# Patient Record
Sex: FEMALE | Race: WHITE | Employment: FULL TIME | URBAN - METROPOLITAN AREA
[De-identification: names, ages, dates, MRNs, and addresses within clinical notes are randomized per-mention and may not be internally consistent; named-entity substitution may affect disease eponyms.]

---

## 2017-01-06 ENCOUNTER — OFFICE VISIT (OUTPATIENT)
Dept: FAMILY MEDICINE CLINIC | Age: 52
End: 2017-01-06

## 2017-01-06 VITALS
HEIGHT: 64 IN | SYSTOLIC BLOOD PRESSURE: 122 MMHG | DIASTOLIC BLOOD PRESSURE: 80 MMHG | HEART RATE: 72 BPM | WEIGHT: 158 LBS | BODY MASS INDEX: 26.98 KG/M2 | OXYGEN SATURATION: 98 %

## 2017-01-06 DIAGNOSIS — E83.52 HYPERCALCEMIA: ICD-10-CM

## 2017-01-06 DIAGNOSIS — F33.1 MODERATE EPISODE OF RECURRENT MAJOR DEPRESSIVE DISORDER (HCC): Primary | ICD-10-CM

## 2017-01-06 DIAGNOSIS — R12 HEARTBURN: ICD-10-CM

## 2017-01-06 DIAGNOSIS — K42.9 UMBILICAL HERNIA WITHOUT OBSTRUCTION AND WITHOUT GANGRENE: ICD-10-CM

## 2017-01-06 PROCEDURE — 99213 OFFICE O/P EST LOW 20 MIN: CPT | Performed by: FAMILY MEDICINE

## 2017-01-06 RX ORDER — ESCITALOPRAM OXALATE 20 MG/1
20 TABLET ORAL DAILY
Qty: 90 TABLET | Refills: 3 | Status: SHIPPED | OUTPATIENT
Start: 2017-01-06 | End: 2017-12-17 | Stop reason: SDUPTHER

## 2017-01-06 RX ORDER — LANSOPRAZOLE 30 MG/1
30 CAPSULE, DELAYED RELEASE ORAL DAILY
Qty: 30 CAPSULE | Refills: 3 | Status: SHIPPED | OUTPATIENT
Start: 2017-01-06 | End: 2017-05-24 | Stop reason: SDUPTHER

## 2017-01-06 RX ORDER — ESCITALOPRAM OXALATE 20 MG/1
20 TABLET ORAL DAILY
Qty: 30 TABLET | Refills: 3 | Status: SHIPPED | OUTPATIENT
Start: 2017-01-06 | End: 2017-01-06 | Stop reason: SDUPTHER

## 2017-01-11 LAB
ANION GAP SERPL CALCULATED.3IONS-SCNC: 12 MMOL/L (ref 3–16)
BUN BLDV-MCNC: 14 MG/DL (ref 7–20)
CALCIUM IONIZED: 1.54 MMOL/L (ref 1.12–1.32)
CALCIUM SERPL-MCNC: 11.8 MG/DL (ref 8.3–10.6)
CHLORIDE BLD-SCNC: 102 MMOL/L (ref 99–110)
CO2: 27 MMOL/L (ref 21–32)
CREAT SERPL-MCNC: 0.9 MG/DL (ref 0.6–1.1)
GFR AFRICAN AMERICAN: >60
GFR NON-AFRICAN AMERICAN: >60
GLUCOSE BLD-MCNC: 91 MG/DL (ref 70–99)
PH VENOUS: 7.39 (ref 7.35–7.45)
POTASSIUM SERPL-SCNC: 5 MMOL/L (ref 3.5–5.1)
SODIUM BLD-SCNC: 141 MMOL/L (ref 136–145)

## 2017-01-13 DIAGNOSIS — E83.52 HYPERCALCEMIA: Primary | ICD-10-CM

## 2017-01-13 DIAGNOSIS — E83.52 HYPERCALCEMIA: ICD-10-CM

## 2017-01-14 LAB — PARATHYROID HORMONE INTACT: 216.7 PG/ML (ref 14–72)

## 2017-02-03 ENCOUNTER — OFFICE VISIT (OUTPATIENT)
Dept: FAMILY MEDICINE CLINIC | Age: 52
End: 2017-02-03

## 2017-02-03 VITALS
SYSTOLIC BLOOD PRESSURE: 138 MMHG | WEIGHT: 158 LBS | DIASTOLIC BLOOD PRESSURE: 88 MMHG | OXYGEN SATURATION: 98 % | HEIGHT: 64 IN | TEMPERATURE: 97.8 F | BODY MASS INDEX: 26.98 KG/M2 | HEART RATE: 67 BPM

## 2017-02-03 DIAGNOSIS — E21.3 HYPERPARATHYROIDISM (HCC): ICD-10-CM

## 2017-02-03 DIAGNOSIS — E83.52 HYPERCALCEMIA: ICD-10-CM

## 2017-02-03 DIAGNOSIS — Z01.818 PREOP EXAMINATION: Primary | ICD-10-CM

## 2017-02-03 PROBLEM — R12 HEARTBURN: Status: ACTIVE | Noted: 2017-02-03

## 2017-02-03 PROCEDURE — 99241 PR OFFICE CONSULTATION NEW/ESTAB PATIENT 15 MIN: CPT | Performed by: FAMILY MEDICINE

## 2017-02-06 LAB
CALCIUM IONIZED: 1.41 MMOL/L (ref 1.12–1.32)
CALCIUM SERPL-MCNC: 11.3 MG/DL (ref 8.3–10.6)
PARATHYROID HORMONE INTACT: 237.1 PG/ML (ref 14–72)
PH VENOUS: 7.36 (ref 7.35–7.45)
VITAMIN D 25-HYDROXY: 27.2 NG/ML

## 2017-04-05 ENCOUNTER — OFFICE VISIT (OUTPATIENT)
Dept: ENDOCRINOLOGY | Age: 52
End: 2017-04-05

## 2017-04-05 VITALS
HEIGHT: 64 IN | BODY MASS INDEX: 27.39 KG/M2 | OXYGEN SATURATION: 97 % | WEIGHT: 160.4 LBS | DIASTOLIC BLOOD PRESSURE: 82 MMHG | HEART RATE: 79 BPM | SYSTOLIC BLOOD PRESSURE: 120 MMHG

## 2017-04-05 DIAGNOSIS — Z98.890 HISTORY OF PARATHYROID SURGERY: ICD-10-CM

## 2017-04-05 DIAGNOSIS — N95.1 MENOPAUSAL STATE: ICD-10-CM

## 2017-04-05 DIAGNOSIS — E21.3 HYPERPARATHYROIDISM (HCC): Primary | ICD-10-CM

## 2017-04-05 DIAGNOSIS — E55.9 VITAMIN D DEFICIENCY: ICD-10-CM

## 2017-04-05 DIAGNOSIS — N20.0 NEPHROLITHIASIS: ICD-10-CM

## 2017-04-05 PROCEDURE — 99204 OFFICE O/P NEW MOD 45 MIN: CPT | Performed by: INTERNAL MEDICINE

## 2017-04-05 ASSESSMENT — PATIENT HEALTH QUESTIONNAIRE - PHQ9
2. FEELING DOWN, DEPRESSED OR HOPELESS: 0
SUM OF ALL RESPONSES TO PHQ9 QUESTIONS 1 & 2: 0
SUM OF ALL RESPONSES TO PHQ QUESTIONS 1-9: 0
1. LITTLE INTEREST OR PLEASURE IN DOING THINGS: 0

## 2017-04-10 LAB
A/G RATIO: 1.9 (ref 1.1–2.2)
ALBUMIN SERPL-MCNC: 4.6 G/DL (ref 3.4–5)
ALP BLD-CCNC: 90 U/L (ref 40–129)
ALT SERPL-CCNC: 15 U/L (ref 10–40)
ANION GAP SERPL CALCULATED.3IONS-SCNC: 16 MMOL/L (ref 3–16)
AST SERPL-CCNC: 19 U/L (ref 15–37)
BILIRUB SERPL-MCNC: 0.3 MG/DL (ref 0–1)
BUN BLDV-MCNC: 12 MG/DL (ref 7–20)
CALCIUM IONIZED: 1.2 MMOL/L (ref 1.12–1.32)
CALCIUM SERPL-MCNC: 9.5 MG/DL (ref 8.3–10.6)
CHLORIDE BLD-SCNC: 98 MMOL/L (ref 99–110)
CO2: 26 MMOL/L (ref 21–32)
CREAT SERPL-MCNC: 0.6 MG/DL (ref 0.6–1.1)
FOLATE: 9.96 NG/ML (ref 4.78–24.2)
GFR AFRICAN AMERICAN: >60
GFR NON-AFRICAN AMERICAN: >60
GLOBULIN: 2.4 G/DL
GLUCOSE BLD-MCNC: 90 MG/DL (ref 70–99)
HOMOCYSTEINE: 15 UMOL/L (ref 0–10)
MAGNESIUM: 2.3 MG/DL (ref 1.8–2.4)
PARATHYROID HORMONE INTACT: 68 PG/ML (ref 14–72)
PH VENOUS: 7.24 (ref 7.35–7.45)
PHOSPHORUS: 3.6 MG/DL (ref 2.5–4.9)
POTASSIUM SERPL-SCNC: 4.6 MMOL/L (ref 3.5–5.1)
SODIUM BLD-SCNC: 140 MMOL/L (ref 136–145)
TOTAL PROTEIN: 7 G/DL (ref 6.4–8.2)
VITAMIN B-12: 344 PG/ML (ref 211–911)
VITAMIN D 25-HYDROXY: 30.7 NG/ML

## 2017-05-17 ENCOUNTER — TELEPHONE (OUTPATIENT)
Dept: ENDOCRINOLOGY | Age: 52
End: 2017-05-17

## 2017-05-25 RX ORDER — LANSOPRAZOLE 30 MG/1
30 CAPSULE, DELAYED RELEASE ORAL DAILY
Qty: 90 CAPSULE | Refills: 3 | Status: SHIPPED | OUTPATIENT
Start: 2017-05-25 | End: 2018-01-29 | Stop reason: SDUPTHER

## 2017-10-12 ENCOUNTER — OFFICE VISIT (OUTPATIENT)
Dept: FAMILY MEDICINE CLINIC | Age: 52
End: 2017-10-12

## 2017-10-12 VITALS
SYSTOLIC BLOOD PRESSURE: 134 MMHG | WEIGHT: 164 LBS | DIASTOLIC BLOOD PRESSURE: 98 MMHG | OXYGEN SATURATION: 98 % | BODY MASS INDEX: 28.15 KG/M2 | HEART RATE: 75 BPM

## 2017-10-12 DIAGNOSIS — M65.331 TRIGGER MIDDLE FINGER OF RIGHT HAND: ICD-10-CM

## 2017-10-12 DIAGNOSIS — I10 ESSENTIAL HYPERTENSION: Primary | ICD-10-CM

## 2017-10-12 DIAGNOSIS — R07.9 CHEST PAIN, UNSPECIFIED TYPE: ICD-10-CM

## 2017-10-12 PROCEDURE — 99214 OFFICE O/P EST MOD 30 MIN: CPT | Performed by: FAMILY MEDICINE

## 2017-10-12 RX ORDER — AMLODIPINE BESYLATE 5 MG/1
5 TABLET ORAL DAILY
Qty: 90 TABLET | Refills: 3 | Status: SHIPPED | OUTPATIENT
Start: 2017-10-12 | End: 2018-01-29 | Stop reason: SDUPTHER

## 2017-10-12 NOTE — PATIENT INSTRUCTIONS

## 2017-10-12 NOTE — PROGRESS NOTES
Subjective:      Patient ID: Claribel Palacios is a 47 y.o. female. HPI patient presents today for her ED follow up from [address] [place] [address].  Patient states that she woke up in the night with chest pain and tightness in the chest.  Her BP was 150/100, 140/90 at home. Here for ED f/u. Went to ED at [facility] with chest pain. Gets \"pinches\" in chest off and on but this lasted longer and woke up in middle of night feeling sick, pain, nausea and vomiting. Does take a heartburn pill before bed normally. Noted bp high during this and the next day better but still little high. Went to work and got BP checked and still high 170/100's so went to ED. Neg EKG and labs in ED, bp 140/70;s.  Sent home and felt ok since. Reveiwed records in care everywhere. Has checked bp few times and top number ok but DBP in 90's. Has noted it creeping up. Has also gained 20 lbs over past few years and thinks that is not helping. Locking of right middle finger x 6 months, swollen, painful. Review of Systems    Objective:   Physical Exam    Body mass index is 28.15 kg/m². Vitals:    10/12/17 1540   BP: (!) 134/98   Site: Left Arm   Position: Sitting   Cuff Size: Large Adult   Pulse: 75   SpO2: 98%   Weight: 164 lb (74.4 kg)     Wt Readings from Last 3 Encounters:   10/12/17 164 lb (74.4 kg)   04/05/17 160 lb 6.4 oz (72.8 kg)   02/03/17 158 lb (71.7 kg)       GENERAL:Alert and oriented x 4 NAD, overweight, well hydrated, well developed.   NECK:supple and non tender without mass, no thyromegaly or thyroid nodules, no cervical lymphadenopathy  LUNG:clear to auscultation bilaterally with normal respiratory effort  CV: Normal heart sounds, regular rate and rhythm without murmurs  EXTREMETY: no loss of hair, no edema, normal pedal pulses bilaterally  rigth hand with triggering middle finger    PHQ-9 Total Score: 0 (4/5/2017  6:14 PM)        Assessment:      Diagnoses and all orders for this visit:    Essential hypertension  Start amlodipine  Monitor at home  RTO 1 month  Work on weight loss    Chest pain, unspecified type  Monitor, ? From GERD    Trigger middle finger of right hand  -     Yenny Sifuentes MD (Hand, Wrist, Upper Extremity)  See hand specialist    Other orders  -     amLODIPine (NORVASC) 5 MG tablet;  Take 1 tablet by mouth daily

## 2017-10-17 ENCOUNTER — OFFICE VISIT (OUTPATIENT)
Dept: ORTHOPEDIC SURGERY | Age: 52
End: 2017-10-17

## 2017-10-17 VITALS
DIASTOLIC BLOOD PRESSURE: 99 MMHG | RESPIRATION RATE: 16 BRPM | BODY MASS INDEX: 26.66 KG/M2 | WEIGHT: 160 LBS | HEIGHT: 65 IN | SYSTOLIC BLOOD PRESSURE: 154 MMHG

## 2017-10-17 DIAGNOSIS — M65.331 TRIGGER MIDDLE FINGER OF RIGHT HAND: ICD-10-CM

## 2017-10-17 PROCEDURE — 99243 OFF/OP CNSLTJ NEW/EST LOW 30: CPT | Performed by: ORTHOPAEDIC SURGERY

## 2017-10-17 PROCEDURE — 20550 NJX 1 TENDON SHEATH/LIGAMENT: CPT | Performed by: ORTHOPAEDIC SURGERY

## 2017-10-17 NOTE — PROGRESS NOTES
This 47 y.o., right hand dominant  is seen in consultation for Lesly Sim MD with a chief complaint of pain, swelling, stiffness and intermittant snapping of the right middle finger. Symptoms have been present for 7 months. The digit is stiff, especially in the morning and will frequently stick in the palm when flexed and pop when extended. This is frequently associated with pain. Mild swelling has been noticed. Treatment has not been prescribed. The problem has worsened slightly recently. There is no history of injury or significant overuse. The pain assessment has been reviewed and is correct as stated. .    The patient's social history, past medical history, family history, medications, allergies and review of systems, entered 10/17/17, have been reviewed, and dated and are recorded in the chart. On examination the patient is Height: 5' 5\" (165.1 cm) tall and weighs Weight: 160 lb (72.6 kg). Respirations are 18 per minute. The patient is well nourished, is oriented to time and place, demonstrates appropriate mood and affect as well as normal gait and station. There is mild soft tissue swelling of the digit. There is no deformity. There is tenderness, thickening and nodularity at the base of the flexor tendon sheath. Range of motion is slightly limited in flexion and extension. The digit sticks in flexion and pops into extension, accompanied by some pain. Skin is intact without lesions. Distal circulation and sensation are intact. Muscle strength and coordination are normal.  Reflexes are present bilaterally. Joints are stable. There are no subcutaneous nodules or enlarged epitrochlear lymph nodes. Impression: Right middle finger trigger digit. The nature of this medical problem is fully discussed with the patient, including all treatment options. All questions are answered.     The right  hand is prepared with Betadine and alcohol and the flexor tendon sheath of the right middle finger is injected with 1/2 milliliter of 1% lidocaine and 20 mg.of triamcinalone, with good filling. The patient is advised regarding the expected response and possible reactions from the injection. The patient is asked to call me if full, painless function has not returned within 4 weeks. The possibility of recurrence of the problem is discussed.

## 2017-10-18 ENCOUNTER — OFFICE VISIT (OUTPATIENT)
Dept: FAMILY MEDICINE CLINIC | Age: 52
End: 2017-10-18

## 2017-10-18 VITALS
DIASTOLIC BLOOD PRESSURE: 98 MMHG | SYSTOLIC BLOOD PRESSURE: 146 MMHG | HEART RATE: 90 BPM | TEMPERATURE: 98.4 F | WEIGHT: 163 LBS | BODY MASS INDEX: 27.12 KG/M2 | OXYGEN SATURATION: 97 %

## 2017-10-18 DIAGNOSIS — R22.1 LOCALIZED SWELLING, MASS AND LUMP, NECK: Primary | ICD-10-CM

## 2017-10-18 PROCEDURE — 99213 OFFICE O/P EST LOW 20 MIN: CPT | Performed by: FAMILY MEDICINE

## 2017-10-18 NOTE — PROGRESS NOTES
Subjective:      Patient ID: Davon Alberto is a 47 y.o. female. HPI patient presents today for swelling around her neck for 1 day and c/o dizziness, drainage, congestion, cough. Her with neck swelling x 1 day, noted last night. Has sore throat. No dif swallowing or breathing. Some sinus drainage x 2 days. Sneezing. No fever. Review of Systems    Objective:   Physical Exam   Neck:         Allergies   Allergen Reactions    Sulfa Antibiotics Nausea And Vomiting       Vitals:    10/18/17 1508   BP: (!) 146/98   Site: Left Arm   Position: Sitting   Cuff Size: Large Adult   Pulse: 90   Temp: 98.4 °F (36.9 °C)   TempSrc: Tympanic   SpO2: 97%   Weight: 163 lb (73.9 kg)     Wt Readings from Last 3 Encounters:   10/18/17 163 lb (73.9 kg)   10/17/17 160 lb (72.6 kg)   10/12/17 164 lb (74.4 kg)     Body mass index is 27.12 kg/m². Alert and oriented x 4 NAD, overweight, well hydrated, well developed. No nodes neck  No nodes axilla bilaterally  Soft swelling bilateral neck and upper chest area above clavicles L>R  Lung clear with good air movement and effort  CV rrr      Assessment:      Satnam Underwood was seen today for neck swelling. Diagnoses and all orders for this visit:    Localized swelling, mass and lump, neck  -     US Head Neck Soft Tissue Thyroid;  Future    get u/s neck  If normal see surgeon

## 2017-10-19 ENCOUNTER — HOSPITAL ENCOUNTER (OUTPATIENT)
Dept: ULTRASOUND IMAGING | Age: 52
Discharge: HOME | End: 2017-10-19
Attending: FAMILY MEDICINE | Admitting: FAMILY MEDICINE

## 2017-10-19 DIAGNOSIS — R22.1 LOCALIZED SWELLING, MASS OR LUMP OF NECK: ICD-10-CM

## 2017-10-19 DIAGNOSIS — R22.1 LOCALIZED SWELLING, MASS AND LUMP, NECK: ICD-10-CM

## 2017-11-10 ENCOUNTER — OFFICE VISIT (OUTPATIENT)
Dept: FAMILY MEDICINE CLINIC | Age: 52
End: 2017-11-10

## 2017-11-10 VITALS
OXYGEN SATURATION: 98 % | SYSTOLIC BLOOD PRESSURE: 136 MMHG | WEIGHT: 160 LBS | HEIGHT: 65 IN | DIASTOLIC BLOOD PRESSURE: 80 MMHG | HEART RATE: 82 BPM | BODY MASS INDEX: 26.66 KG/M2

## 2017-11-10 DIAGNOSIS — Z01.818 PREOP EXAMINATION: ICD-10-CM

## 2017-11-10 DIAGNOSIS — K42.9 UMBILICAL HERNIA WITHOUT OBSTRUCTION AND WITHOUT GANGRENE: Primary | ICD-10-CM

## 2017-11-10 DIAGNOSIS — I10 ESSENTIAL HYPERTENSION: ICD-10-CM

## 2017-11-10 PROCEDURE — 99241 PR OFFICE CONSULTATION NEW/ESTAB PATIENT 15 MIN: CPT | Performed by: FAMILY MEDICINE

## 2017-11-10 PROCEDURE — 93000 ELECTROCARDIOGRAM COMPLETE: CPT | Performed by: FAMILY MEDICINE

## 2017-12-19 RX ORDER — ESCITALOPRAM OXALATE 20 MG/1
TABLET ORAL
Qty: 90 TABLET | Refills: 1 | Status: SHIPPED | OUTPATIENT
Start: 2017-12-19 | End: 2018-01-29

## 2018-01-29 ENCOUNTER — HOSPITAL ENCOUNTER (OUTPATIENT)
Dept: OTHER | Age: 53
Discharge: HOME | End: 2018-01-29
Attending: FAMILY MEDICINE | Admitting: FAMILY MEDICINE

## 2018-01-29 ENCOUNTER — OFFICE VISIT (OUTPATIENT)
Dept: FAMILY MEDICINE CLINIC | Age: 53
End: 2018-01-29

## 2018-01-29 VITALS
OXYGEN SATURATION: 98 % | DIASTOLIC BLOOD PRESSURE: 94 MMHG | HEART RATE: 95 BPM | WEIGHT: 158 LBS | BODY MASS INDEX: 26.29 KG/M2 | SYSTOLIC BLOOD PRESSURE: 140 MMHG

## 2018-01-29 DIAGNOSIS — H53.9 VISION DISTURBANCE: Primary | ICD-10-CM

## 2018-01-29 DIAGNOSIS — E21.3 HYPERPARATHYROIDISM (HCC): ICD-10-CM

## 2018-01-29 DIAGNOSIS — T16.9XXA: ICD-10-CM

## 2018-01-29 DIAGNOSIS — R41.0 DISORIENTATION: ICD-10-CM

## 2018-01-29 LAB
A/G RATIO: 1.5 (ref 1.1–2.2)
ALBUMIN SERPL-MCNC: 4.7 G/DL (ref 3.4–5)
ALP BLD-CCNC: 72 U/L (ref 40–129)
ALT SERPL-CCNC: 18 U/L (ref 10–40)
ANION GAP SERPL CALCULATED.3IONS-SCNC: 19 MMOL/L (ref 3–16)
AST SERPL-CCNC: 23 U/L (ref 15–37)
BILIRUB SERPL-MCNC: 0.3 MG/DL (ref 0–1)
BUN BLDV-MCNC: 20 MG/DL (ref 7–20)
CALCIUM SERPL-MCNC: 9.3 MG/DL (ref 8.3–10.6)
CHLORIDE BLD-SCNC: 98 MMOL/L (ref 99–110)
CO2: 21 MMOL/L (ref 21–32)
CREAT SERPL-MCNC: 0.6 MG/DL (ref 0.6–1.1)
GFR AFRICAN AMERICAN: >60
GFR NON-AFRICAN AMERICAN: >60
GLOBULIN: 3.2 G/DL
GLUCOSE BLD-MCNC: 80 MG/DL (ref 70–99)
PARATHYROID HORMONE INTACT: 45.2 PG/ML (ref 14–72)
POTASSIUM SERPL-SCNC: 4.2 MMOL/L (ref 3.5–5.1)
SODIUM BLD-SCNC: 138 MMOL/L (ref 136–145)
TOTAL PROTEIN: 7.9 G/DL (ref 6.4–8.2)
VITAMIN D 25-HYDROXY: 40.7 NG/ML

## 2018-01-29 PROCEDURE — 99214 OFFICE O/P EST MOD 30 MIN: CPT | Performed by: FAMILY MEDICINE

## 2018-01-29 RX ORDER — LANSOPRAZOLE 30 MG/1
30 CAPSULE, DELAYED RELEASE ORAL DAILY
Qty: 90 CAPSULE | Refills: 3 | Status: SHIPPED | OUTPATIENT
Start: 2018-01-29 | End: 2021-05-25 | Stop reason: SDUPTHER

## 2018-01-29 RX ORDER — AMLODIPINE BESYLATE 5 MG/1
5 TABLET ORAL DAILY
Qty: 90 TABLET | Refills: 3 | Status: SHIPPED | OUTPATIENT
Start: 2018-01-29 | End: 2019-01-09 | Stop reason: SDUPTHER

## 2018-01-29 RX ORDER — LORAZEPAM 1 MG/1
TABLET ORAL
Qty: 2 TABLET | Refills: 0 | Status: SHIPPED | OUTPATIENT
Start: 2018-01-29 | End: 2018-03-28

## 2018-01-29 NOTE — PROGRESS NOTES
bilaterally  Lungs CTA B with good air movement  CV RRR no M appreciated  CN grossly intact  PERRL, EOMI, no nystagmus  Normal UE and LE strength bilaterally  Normal reflexes bilaterally knee and elbow  Normal sensation to light touch  Normal FTN, ELLA normal, neg rhomberg, neg pronator drift  Normal gait  Tandem gait normal, heel walk normal, toe walk normal    Assessment:      Deepa Pineda was seen today for memory loss. Diagnoses and all orders for this visit:    Vision disturbance  Disorientation  ? Migraine HA  Check labs and MRI brain    Non-penetrating foreign body in ear canal, initial encounter  Dog hair in ears, if bothers see ENT    Hyperparathyroidism ([facility] [address]  -     Comprehensive Metabolic Panel; Future  -     PTH, INTACT; Future  -     VITAMIN D 25 HYDROXY; Future    Other orders  -     amLODIPine (NORVASC) 5 MG tablet; Take 1 tablet by mouth daily  -     lansoprazole (PREVACID) 30 MG delayed release capsule; Take 1 capsule by mouth daily  -     LORazepam (ATIVAN) 1 MG tablet; Take 1 tab 30 min prior to procedure if needed for anxiety, may repeat x 1. Must have . [place]

## 2018-01-30 ENCOUNTER — TELEPHONE (OUTPATIENT)
Dept: FAMILY MEDICINE CLINIC | Age: 53
End: 2018-01-30

## 2018-01-30 DIAGNOSIS — H53.9 VISUAL DISTURBANCE: Primary | ICD-10-CM

## 2018-01-30 DIAGNOSIS — R41.0 DISORIENTATION: ICD-10-CM

## 2018-02-01 ENCOUNTER — TELEPHONE (OUTPATIENT)
Dept: FAMILY MEDICINE CLINIC | Age: 53
End: 2018-02-01

## 2018-02-01 DIAGNOSIS — E23.7 PITUITARY LESION (HCC): Primary | ICD-10-CM

## 2018-02-01 NOTE — TELEPHONE ENCOUNTER
Fax received from [email or web address] requesting alternative rx for lansoprazole. Approved alternatives are omeprazole and pantoprazole. Please advise.

## 2018-02-02 DIAGNOSIS — E23.7 PITUITARY LESION (HCC): ICD-10-CM

## 2018-02-02 LAB — PROLACTIN: 6.1 NG/ML

## 2018-02-02 NOTE — TELEPHONE ENCOUNTER
Please ignore previous note entered by error. Needing to know which preferred alternative is ok. Please advise.

## 2018-06-07 ENCOUNTER — TELEPHONE (OUTPATIENT)
Dept: FAMILY MEDICINE CLINIC | Age: 53
End: 2018-06-07

## 2018-06-07 ENCOUNTER — OFFICE VISIT (OUTPATIENT)
Dept: FAMILY MEDICINE CLINIC | Age: 53
End: 2018-06-07

## 2018-06-07 VITALS
OXYGEN SATURATION: 98 % | WEIGHT: 156 LBS | HEART RATE: 93 BPM | SYSTOLIC BLOOD PRESSURE: 132 MMHG | DIASTOLIC BLOOD PRESSURE: 82 MMHG | BODY MASS INDEX: 25.96 KG/M2

## 2018-06-07 DIAGNOSIS — Z12.31 ENCOUNTER FOR SCREENING MAMMOGRAM FOR BREAST CANCER: ICD-10-CM

## 2018-06-07 DIAGNOSIS — E78.00 PURE HYPERCHOLESTEROLEMIA: ICD-10-CM

## 2018-06-07 DIAGNOSIS — Z12.11 COLON CANCER SCREENING: ICD-10-CM

## 2018-06-07 DIAGNOSIS — Z00.00 WELL ADULT EXAM: Primary | ICD-10-CM

## 2018-06-07 DIAGNOSIS — I10 ESSENTIAL HYPERTENSION: ICD-10-CM

## 2018-06-07 PROCEDURE — 99396 PREV VISIT EST AGE 40-64: CPT | Performed by: FAMILY MEDICINE

## 2018-06-07 RX ORDER — ATORVASTATIN CALCIUM 40 MG/1
40 TABLET, FILM COATED ORAL DAILY
Qty: 30 TABLET | Refills: 3 | Status: SHIPPED | OUTPATIENT
Start: 2018-06-07 | End: 2018-06-07 | Stop reason: SDUPTHER

## 2018-06-07 RX ORDER — ATORVASTATIN CALCIUM 40 MG/1
40 TABLET, FILM COATED ORAL DAILY
Qty: 90 TABLET | Refills: 3 | Status: SHIPPED | OUTPATIENT
Start: 2018-06-07 | End: 2019-01-09 | Stop reason: SDUPTHER

## 2018-06-07 ASSESSMENT — PATIENT HEALTH QUESTIONNAIRE - PHQ9
SUM OF ALL RESPONSES TO PHQ9 QUESTIONS 1 & 2: 0
2. FEELING DOWN, DEPRESSED OR HOPELESS: 0
1. LITTLE INTEREST OR PLEASURE IN DOING THINGS: 0
SUM OF ALL RESPONSES TO PHQ QUESTIONS 1-9: 0

## 2018-06-15 RX ORDER — ESCITALOPRAM OXALATE 20 MG/1
TABLET ORAL
Qty: 90 TABLET | Refills: 1 | OUTPATIENT
Start: 2018-06-15

## 2018-09-04 ENCOUNTER — OFFICE VISIT (OUTPATIENT)
Dept: ORTHOPEDIC SURGERY | Age: 53
End: 2018-09-04

## 2018-09-04 VITALS
BODY MASS INDEX: 25.99 KG/M2 | WEIGHT: 156 LBS | RESPIRATION RATE: 16 BRPM | DIASTOLIC BLOOD PRESSURE: 86 MMHG | HEIGHT: 65 IN | SYSTOLIC BLOOD PRESSURE: 130 MMHG

## 2018-09-04 DIAGNOSIS — M65.331 TRIGGER MIDDLE FINGER OF RIGHT HAND: Primary | ICD-10-CM

## 2018-09-04 PROCEDURE — 99213 OFFICE O/P EST LOW 20 MIN: CPT | Performed by: ORTHOPAEDIC SURGERY

## 2018-09-04 PROCEDURE — 20550 NJX 1 TENDON SHEATH/LIGAMENT: CPT | Performed by: ORTHOPAEDIC SURGERY

## 2019-01-09 RX ORDER — AMLODIPINE BESYLATE 5 MG/1
5 TABLET ORAL DAILY
Qty: 90 TABLET | Refills: 3 | Status: SHIPPED | OUTPATIENT
Start: 2019-01-09 | End: 2020-02-17

## 2019-01-09 RX ORDER — ATORVASTATIN CALCIUM 40 MG/1
40 TABLET, FILM COATED ORAL DAILY
Qty: 90 TABLET | Refills: 3 | Status: SHIPPED | OUTPATIENT
Start: 2019-01-09 | End: 2020-03-02

## 2019-01-14 RX ORDER — PANTOPRAZOLE SODIUM 40 MG/1
TABLET, DELAYED RELEASE ORAL
Qty: 90 TABLET | Refills: 3 | OUTPATIENT
Start: 2019-01-14

## 2019-04-01 ENCOUNTER — OFFICE VISIT (OUTPATIENT)
Dept: ORTHOPEDIC SURGERY | Age: 54
End: 2019-04-01
Payer: COMMERCIAL

## 2019-04-01 VITALS
SYSTOLIC BLOOD PRESSURE: 139 MMHG | HEIGHT: 66 IN | BODY MASS INDEX: 25.71 KG/M2 | DIASTOLIC BLOOD PRESSURE: 86 MMHG | HEART RATE: 72 BPM | WEIGHT: 160 LBS

## 2019-04-01 DIAGNOSIS — M65.331 TRIGGER MIDDLE FINGER OF RIGHT HAND: Primary | ICD-10-CM

## 2019-04-01 PROCEDURE — 20550 NJX 1 TENDON SHEATH/LIGAMENT: CPT | Performed by: ORTHOPAEDIC SURGERY

## 2019-04-01 PROCEDURE — 99213 OFFICE O/P EST LOW 20 MIN: CPT | Performed by: ORTHOPAEDIC SURGERY

## 2019-04-01 NOTE — PROGRESS NOTES
I last examined this patient 6 months ago at which time I injected the right middle finger for treatment of trigger finger. She obtained prompt and complete relief of all symptoms. Unfortunately, the condition has recurred and the patient returns to the office requesting additional treatment. She complains of pain, swelling, catching and stiffness of the digit for the past 3 months. Symptoms have become worse recently. The patient's social history, past medical history, family history, medications, allergies and review of systems have been reviewed, and dated 4/1/19 and are recorded in the chart. On examination there is mild soft tissue swelling of the digit. There is no deformity. There is tenderness, thickening and nodularity at the base of the flexor tendon sheath. Range of motion is slightly limited in flexion and extension. The digit sticks in flexion and pops into extension, accompanied by some pain. Skin is intact without lesions. Distal circulation and sensation are intact. Muscle strength and coordination are normal.  Reflexes and present bilaterally. Joints are stable. There are no subcutaneous nodules or enlarged epitrochlear lymph nodes. Impression: Recurrent right middle finger trigger digit. The nature of this medical problem is fully discussed with the patient, including all treatment options. All questions are answered. The right  hand is prepared with Betadine and alcohol and the flexor tendon sheath of the right middle finger is injected with 1/2 milliliter of 1% lidocaine and 20 mg.of triamcinalone, with good filling. The patient is advised regarding the expected response and possible reactions from the injection. The patient is asked to call me if full, painless function has not returned within 4 weeks. The patient understands that this is the 3rd and last steroid injection for this digit.   If the problem recurs in this digit, they are asked to call me to discus surgical correction.

## 2019-05-10 NOTE — PRE-PROCEDURE INSTRUCTIONS
Pre-procedure phone call was made to patient and there was no answer. Detailed message was left for patient.

## 2019-05-13 ENCOUNTER — HOSPITAL ENCOUNTER (OUTPATIENT)
Age: 54
Setting detail: OUTPATIENT SURGERY
Discharge: HOME | End: 2019-05-13
Attending: INTERNAL MEDICINE | Admitting: INTERNAL MEDICINE
Payer: COMMERCIAL

## 2019-05-13 VITALS
DIASTOLIC BLOOD PRESSURE: 89 MMHG | BODY MASS INDEX: 24.91 KG/M2 | SYSTOLIC BLOOD PRESSURE: 122 MMHG | HEIGHT: 66 IN | HEART RATE: 87 BPM | TEMPERATURE: 97.1 F | OXYGEN SATURATION: 96 % | WEIGHT: 155 LBS | RESPIRATION RATE: 16 BRPM

## 2019-05-13 PROCEDURE — 2709999900 HC NON-CHARGEABLE SUPPLY: Performed by: INTERNAL MEDICINE

## 2019-05-13 PROCEDURE — 7100000011 HC PHASE II RECOVERY - ADDTL 15 MIN: Performed by: INTERNAL MEDICINE

## 2019-05-13 PROCEDURE — 99152 MOD SED SAME PHYS/QHP 5/>YRS: CPT | Performed by: INTERNAL MEDICINE

## 2019-05-13 PROCEDURE — 3609010600 HC COLONOSCOPY POLYPECTOMY SNARE/COLD BIOPSY: Performed by: INTERNAL MEDICINE

## 2019-05-13 PROCEDURE — 7100000010 HC PHASE II RECOVERY - FIRST 15 MIN: Performed by: INTERNAL MEDICINE

## 2019-05-13 PROCEDURE — 6360000002 HC RX W HCPCS: Performed by: INTERNAL MEDICINE

## 2019-05-13 PROCEDURE — 99153 MOD SED SAME PHYS/QHP EA: CPT | Performed by: INTERNAL MEDICINE

## 2019-05-13 PROCEDURE — 2580000003 HC RX 258: Performed by: INTERNAL MEDICINE

## 2019-05-13 RX ORDER — MIDAZOLAM HYDROCHLORIDE 1 MG/ML
INJECTION INTRAMUSCULAR; INTRAVENOUS PRN
Status: DISCONTINUED | OUTPATIENT
Start: 2019-05-13 | End: 2019-05-13 | Stop reason: ALTCHOICE

## 2019-05-13 RX ORDER — SODIUM CHLORIDE 9 MG/ML
INJECTION, SOLUTION INTRAVENOUS CONTINUOUS
Status: DISCONTINUED | OUTPATIENT
Start: 2019-05-13 | End: 2019-05-13 | Stop reason: HOSPADM

## 2019-05-13 RX ORDER — FENTANYL CITRATE 50 UG/ML
INJECTION, SOLUTION INTRAMUSCULAR; INTRAVENOUS PRN
Status: DISCONTINUED | OUTPATIENT
Start: 2019-05-13 | End: 2019-05-13 | Stop reason: ALTCHOICE

## 2019-05-13 RX ADMIN — SODIUM CHLORIDE: 9 INJECTION, SOLUTION INTRAVENOUS at 06:31

## 2019-05-13 ASSESSMENT — PAIN SCALES - GENERAL
PAINLEVEL_OUTOF10: 0

## 2019-05-13 ASSESSMENT — PAIN - FUNCTIONAL ASSESSMENT: PAIN_FUNCTIONAL_ASSESSMENT: 0-10

## 2020-02-17 RX ORDER — AMLODIPINE BESYLATE 5 MG/1
TABLET ORAL
Qty: 90 TABLET | Refills: 4 | Status: SHIPPED | OUTPATIENT
Start: 2020-02-17 | End: 2021-05-13

## 2020-03-02 RX ORDER — ATORVASTATIN CALCIUM 40 MG/1
TABLET, FILM COATED ORAL
Qty: 90 TABLET | Refills: 4 | Status: SHIPPED | OUTPATIENT
Start: 2020-03-02 | End: 2021-05-20

## 2021-05-12 DIAGNOSIS — I10 ESSENTIAL HYPERTENSION: ICD-10-CM

## 2021-05-12 DIAGNOSIS — E78.00 PURE HYPERCHOLESTEROLEMIA: Primary | ICD-10-CM

## 2021-05-13 RX ORDER — AMLODIPINE BESYLATE 5 MG/1
TABLET ORAL
Qty: 30 TABLET | Refills: 0 | Status: SHIPPED | OUTPATIENT
Start: 2021-05-13 | End: 2021-05-25 | Stop reason: SDUPTHER

## 2021-05-13 NOTE — TELEPHONE ENCOUNTER
Medication:   Requested Prescriptions     Pending Prescriptions Disp Refills    amLODIPine (NORVASC) 5 MG tablet [Pharmacy Med Name: AMLODIPINE BESYLATE TABS 5MG] 90 tablet 3     Sig: TAKE 1 TABLET DAILY          Patient Phone Number: [phone] (home)     Last appt: Visit date not found   Next appt: Visit date not found    Last OARRS: No flowsheet data found.   PDMP Monitoring:    Last PDMP Mick Brown as Reviewed [facility]):  Review User Review Instant Review Result          Preferred Pharmacy:   [facility] , [place] [phone] - F [phone]  [address] [place] [address] Drive [address]  Phone: [phone] Fax: Dayanna Abebe. [phone] [facility] [address] [place] [phone] [facility] [phone]  [address] [place] [address]  Phone: [phone] Fax: [phone]

## 2021-05-25 ENCOUNTER — OFFICE VISIT (OUTPATIENT)
Dept: FAMILY MEDICINE CLINIC | Age: 56
End: 2021-05-25
Payer: COMMERCIAL

## 2021-05-25 VITALS
SYSTOLIC BLOOD PRESSURE: 136 MMHG | OXYGEN SATURATION: 99 % | DIASTOLIC BLOOD PRESSURE: 84 MMHG | TEMPERATURE: 98.1 F | HEIGHT: 65 IN | HEART RATE: 78 BPM | BODY MASS INDEX: 27.42 KG/M2 | WEIGHT: 164.6 LBS

## 2021-05-25 DIAGNOSIS — I10 ESSENTIAL HYPERTENSION: ICD-10-CM

## 2021-05-25 DIAGNOSIS — Z12.31 ENCOUNTER FOR SCREENING MAMMOGRAM FOR MALIGNANT NEOPLASM OF BREAST: ICD-10-CM

## 2021-05-25 DIAGNOSIS — K21.9 GASTROESOPHAGEAL REFLUX DISEASE, UNSPECIFIED WHETHER ESOPHAGITIS PRESENT: ICD-10-CM

## 2021-05-25 DIAGNOSIS — E78.00 PURE HYPERCHOLESTEROLEMIA: ICD-10-CM

## 2021-05-25 DIAGNOSIS — Z00.00 WELL ADULT EXAM: Primary | ICD-10-CM

## 2021-05-25 PROBLEM — M65.331 TRIGGER MIDDLE FINGER OF RIGHT HAND: Status: RESOLVED | Noted: 2017-10-17 | Resolved: 2021-05-25

## 2021-05-25 PROBLEM — F33.1 MODERATE EPISODE OF RECURRENT MAJOR DEPRESSIVE DISORDER (HCC): Status: RESOLVED | Noted: 2017-01-06 | Resolved: 2021-05-25

## 2021-05-25 PROBLEM — E21.3 HYPERPARATHYROIDISM (HCC): Status: RESOLVED | Noted: 2017-02-03 | Resolved: 2021-05-25

## 2021-05-25 PROCEDURE — 99396 PREV VISIT EST AGE 40-64: CPT | Performed by: FAMILY MEDICINE

## 2021-05-25 RX ORDER — AMLODIPINE BESYLATE 5 MG/1
TABLET ORAL
Qty: 90 TABLET | Refills: 3 | Status: SHIPPED | OUTPATIENT
Start: 2021-05-25 | End: 2022-06-06 | Stop reason: SDUPTHER

## 2021-05-25 RX ORDER — LANSOPRAZOLE 30 MG/1
30 CAPSULE, DELAYED RELEASE ORAL DAILY
Qty: 90 CAPSULE | Refills: 3 | Status: SHIPPED | OUTPATIENT
Start: 2021-05-25

## 2021-05-25 RX ORDER — ATORVASTATIN CALCIUM 40 MG/1
40 TABLET, FILM COATED ORAL DAILY
Qty: 90 TABLET | Refills: 3 | Status: SHIPPED | OUTPATIENT
Start: 2021-05-25 | End: 2022-06-06 | Stop reason: SDUPTHER

## 2021-05-25 ASSESSMENT — PATIENT HEALTH QUESTIONNAIRE - PHQ9
SUM OF ALL RESPONSES TO PHQ QUESTIONS 1-9: 0
SUM OF ALL RESPONSES TO PHQ9 QUESTIONS 1 & 2: 0

## 2021-05-25 NOTE — PATIENT INSTRUCTIONS
your chart. Link to forms:   [email or web address]  [email or web address]        Advance Directives, DNR, and MOLST    Decision making at the end of life is difficult for patients, families and health care providers. Since the early 1990s, a number of forms have been developed to help people express their wishes in advance. What are \"advance directives\"? Advance directives are documents that can help you remain in charge of your health care even after you can no longer make decisions for yourself. The two most common forms of written advance directives are the living will and durable power of  for healthcare. Some people seek an s services to complete these documents; however this is not required. You can complete these documents yourself and have them either notarized or witnessed by two people who are over 25 and not related to you by blood or marriage. What is a \"living will\"? A living will is a document that tells your doctor how you want to be treated if when you are determined to be terminally ill or permanently unconscious and you cannot make decisions for yourself. You can use a living will if you want to avoid life-prolonging treatments such as cardiopulmonary resuscitation (CPR), kidney dialysis or breathing machines. You can use your living will to tell your doctor that you just want to be pain free at the end of our life. In [place], the living will is sometimes called a \"declaration\". A living will form can be obtained from attorneys, [employer], and healthcare facilities. This signed form must be notarized or witnessed. What is a \"durable power of  for healthcare\"?      A medical power of  (medical POA) is another type of advance directive that allows you to name a person to make health care decisions for you if and when you become unable to make them for yourself. The person you name to make decisions on your behalf is some times called your health care surrogate, agent, proxy or -in-fact. The person who holds your medical POA can respond to medical situations you might not have anticipated and make decisions for you empowered by knowledge of your values and wishes. The medical POA form can be obtained from attorneys, [facility], and healthcare facilities. This signed form must be notarized or witnessed. The medical POA document is different from the power of  form that authorizes someone to make financial transactions for you. What is cardiopulmonary resuscitation (CPR)? CPR is a technique useful in many emergencies, including heart attack or near drowning, in which someone's breathing or heartbeat has stopped. CPR may include chest compression, mouth-to-mouth or other rescue breathing and/or electric shock. What is a DNR -Comfort Care form (a.k.a. [facility])? DNR means do not resuscitate. A DNR is a medical order given by a physician or other legally authorized prescriber. It addresses the various methods used to revive people whose hearts have stopped beating /or who have stopped breathing. If a person has a [facility] order, he will receive care that eases pain and suffering but no cardio-pulmonary resuscitation (CPR) to save or prolong life. The [facility] becomes active as soon as it is signed by the doctor or advanced practice nurse. The [facility] is a standard form which can be obtained from the [address] or [facility] facilities. What is DNR Comfort Care - Arrest (a.k.a. [address])? The [address] is similar to the [facility] but it only becomes active if and when the person has a cardiac and/or respiratory arrest (i.e. the person stops breathing or his heart stops beating).  The [address] is a standard form which can be obtained from the [address] or healthcare facilities. What is a MOLST? MOLST stands for Medical Orders for Life Sustaining Treatment. [place]oon is a medical order which specifies different treatment options for individuals who are seriously ill or frail and elderly. The MOLST allows patients or their surrogate to discuss care options they do and do not want to receive at the end of life, and then have their physician or other prescriber convey them into orders. This form would be available through [address] [facility] and healthcare facilities.

## 2021-05-25 NOTE — PROGRESS NOTES
Here for annual physical and f/u HTN, chol. Dental: up-to-date  Eye: up-to-date    Colonoscopy: up-to-date    Pap: 2 years ago  Mammo: ordered    Exercise: Active at work, [address]  Diet: not good    Living Will: No,   Additional information provided    PHQ Scores 5/25/2021 6/7/2018 4/5/2017   PHQ2 Score 0 0 0   PHQ9 Score 0 0 0     Checks bp at home, states bottom runs 85-90. Top number is ok. Taking meds reg no SE    Taking chol meds reg, no SE. Takes prevacid as needed for heartburn, 2-3 times a week. Will take tums sometimes as well. Had custody of granddtr who is 5yo for 6 months. Mother lost custody. Pt son now has custody. Got covid vaccine at work [employer]). Will send us card    HM reviewed with pt    Patient's medications, allergies, past medical, surgical, social and family histories were reviewed and updated in the EHR as appropriate. Vitals:    05/25/21 1438   BP: 136/84   Site: Left Upper Arm   Position: Sitting   Cuff Size: Large Adult   Pulse: 78   Temp: 98.1 °F (36.7 °C)   TempSrc: Infrared   SpO2: 99%   Weight: 164 lb 9.6 oz (74.7 kg)   Height: 5' 5\" (1.651 m)     Wt Readings from Last 3 Encounters:   05/25/21 164 lb 9.6 oz (74.7 kg)   05/13/19 155 lb (70.3 kg)   04/01/19 160 lb (72.6 kg)     Body mass index is 27.39 kg/m². GENERAL Alert and oriented x 4 NAD, affect appropriate and overweight, well hydrated, well developed.   NECK:supple and non tender without mass, no thyromegaly or thyroid nodules, no cervical lymphadenopathy  HEENT: TM clear bilaterally  LUNG:clear to auscultation bilaterally with normal respiratory effort  CV: Normal heart sounds, regular rate and rhythm without murmurs  EXTREMETY: no loss of hair, no edema, normal pedal pulses bilaterally  NEURO: CN grossly intact, moving all extremities equally, no gross deficits          ASSESSMENT AND PLAN:       Tiffany Alcaraz was seen today for hypertension and annual exam.    Diagnoses and all orders for this

## 2021-12-08 ENCOUNTER — OFFICE VISIT (OUTPATIENT)
Dept: ORTHOPEDIC SURGERY | Age: 56
End: 2021-12-08
Payer: COMMERCIAL

## 2021-12-08 VITALS — WEIGHT: 138 LBS | RESPIRATION RATE: 16 BRPM | HEIGHT: 66 IN | BODY MASS INDEX: 22.18 KG/M2

## 2021-12-08 DIAGNOSIS — M65.30 TRIGGER FINGER, ACQUIRED: Primary | ICD-10-CM

## 2021-12-08 PROCEDURE — G8427 DOCREV CUR MEDS BY ELIG CLIN: HCPCS | Performed by: ORTHOPAEDIC SURGERY

## 2021-12-08 PROCEDURE — G8484 FLU IMMUNIZE NO ADMIN: HCPCS | Performed by: ORTHOPAEDIC SURGERY

## 2021-12-08 PROCEDURE — 3017F COLORECTAL CA SCREEN DOC REV: CPT | Performed by: ORTHOPAEDIC SURGERY

## 2021-12-08 PROCEDURE — 1036F TOBACCO NON-USER: CPT | Performed by: ORTHOPAEDIC SURGERY

## 2021-12-08 PROCEDURE — G8420 CALC BMI NORM PARAMETERS: HCPCS | Performed by: ORTHOPAEDIC SURGERY

## 2021-12-08 PROCEDURE — 99204 OFFICE O/P NEW MOD 45 MIN: CPT | Performed by: ORTHOPAEDIC SURGERY

## 2021-12-08 NOTE — PROGRESS NOTES
I last examined this patient 2 1/2/ years ago at which time I injected the right middle finger for treatment of trigger finger. She obtained prompt and complete relief of all symptoms. Unfortunately, the condition has recurred and the patient returns to the office requesting additional treatment. She complains of pain, swelling, catching and stiffness of the digit for the past 3 months. Symptoms have become worse recently. The patient's social history, past medical history, family history, medications, allergies and review of systems have been reviewed, dated 12/8/21 and are recorded in the chart. I have personally examined and reviewed all previous imaging studies, laboratory tests(magnesium,CMP,lipid panel) and medical encounters pertinent to this patient's visit today. On examination there is mild soft tissue swelling of the digit. There is no deformity. There is tenderness, thickening and nodularity at the base of the flexor tendon sheath. Range of motion is slightly limited in flexion and extension. The digit sticks in flexion and pops into extension, accompanied by some pain. Skin is intact without lesions. Distal circulation and sensation are intact. Muscle strength and coordination are normal.  Reflexes and present bilaterally. Joints are stable. There are no subcutaneous nodules or enlarged epitrochlear lymph nodes. Impression: Recurrent right middle finger trigger digit. The nature of their medical problem is fully discussed with the patient, including all treatment options. Surgery is also discussed, including the possible risks, complications, prognosis and postoperative care. All questions are answered. The surgery consent form is explained and signed. Surgery will be scheduled and the patient is asked to call me if there are any additional questions. The patient understands that the surgery will be done by Dr. Alex Law.

## 2021-12-08 NOTE — LETTER
CONSENT TO OPERATION  AND/OR OTHER PROCEDURE(S)          PATIENT : Viral Sandoval   YOB: 1965      DATE : 12/8/21          1. I request and consent that Dr. Carlos Reed. Rickie Moore,  and/or his associates or assistants perform an operation and/or procedures on the above patient at  [place] [address], to treat the condition(s) which appear indicated by the diagnostic studies already performed. I have been told that in general terms the nature, purpose and reasonable expectations of the operation and/or procedure(s) are:     Release Right Middle Trigger Finger      2. It has been explained to me by the informing physician that during the course of the operation and/or procedure(s) unforeseen conditions may be revealed that necessitate an extension of the original operation and/or procedure(s) or different operation and/or procedures than those set forth in Paragraph 1. I therefore authorize and request that my physician and/or his associates or assistants perform such operations and/or procedures as are necessary and desirable in the exercise of professional judgment. The authority granted under this Paragraph 2 shall extend to all conditions that require treatment and are known to my physician at the time the operation is commenced. 3. I have been made aware by the informing physician of certain risks and consequences that are associated with the operation and/or procedure(s) described in Paragraph 1, the reasonable alternative methods or treatment, the possible consequences, the possibility that the operation and/or procedure(s) may be unsuccessful and the possibility of complications.   I understand the reasonably known risks to be:      - Bleeding  - Infection  - Poor Healing  - Possible Damage to Nerve, Vessel, Tendon/Muscle or Bone  - Need for further Treatment/Surgery  - Stiffness  - Pain  - Residual or Recurrent Symptoms  - Anesthetic and/or Medical Risks  - We have discussed the specific limitations and risks of hospital and/or office based treatment at this time due to the COVID-19 pandemic                I have been counseled about the risks of carline Covid-19 in the majo-operative and post-operative periods related to this procedure. I have been made aware that carline Covid-19 around the time of a surgical procedure may worsen my prognosis for recovering from the virus and lend to a higher morbidity and or mortality risk. With this knowledge, I have requested to proceed with the procedure as scheduled. 4. I have also been informed by the informing physician that there are other risks from both known and unknown causes that are attendant to the performance of any surgical procedure. I am aware that the practice of medicine and surgery is not an exact science, and that no guarantees have been made to me concerning the results of the operation and/or procedure(s). 5. I   CONSENT / REFUSE CONSENT  (strike the phrase that does not apply) to the taking of photographs before, during and/or after the operation or procedure for scientific/educational purposes. 6. I consent to the administration of anesthesia and to the use of such anesthetics as may be deemed advisable by the anesthesiologist who has been engaged by me or my physician.     7. I certify that I have read and understand the above consent to operation and/or other procedure(s); that the explanations therein referred to were made to me by the informing physician in advance of my signing this consent; that all blanks or statements requiring insertion or completion were filled in and inapplicable paragraphs, if any, were stricken before I signed; and that all questions asked by me about the operation and/or procedure(s) which I have consented to have been fully answered in a satisfactory manner.                                 _______________________           12/8/21 Witness     Signature Of Patient         Date        Chantelle Chappell. Sesar                                                 Informing Physician                                           Signature of Informing Physician                              If patient is unable to sign or is a minor, complete one of the following:    (A)  Patient is a minor   years of age. (B)  Patient is unable to sign because: The undersigned represents that he or she is duly authorized to execute this consent for and on behalf of the above named patient. Witness               o  Parent  o  Guardian   o  Spouse       o  Other (specify)                                           Patient Name: Beatriz Barros  Patient YOB: 1965  Dr. Mira Donnelly' Return To Work Policy  Regarding your ability to return to work after surgery or injury, Dr. Mira Donnelly will not state that any patient is off of work or cannot work at all. He will place you on restrictions after your surgical procedure or injury. Depending on the details of your particular situation, Dr. Mira Donnelly may state that you will have either light use or no use of your hand for a specific number of weeks. It is your obligation to communicate with your employer regarding your restrictions. It is your employer's decision as to whether they will accommodate your restrictions (i.e. allow you to come to work in your restricted capacity) or to not allow you to return to work under your restrictions. Dr. Mira Donnelly does not participate in making this decision and cannot influence your employer regarding their decision. If you do not communicate your restrictions to your employer, or if you do not present to work as you are scheduled to, Dr. Mira Donnelly will not provide an 'excuse' to explain your absence. A doctors note, or official forms (BWC, FMLA, etc.) will be filled out, upon request, to indicate your date of surgery and your restrictions as stated above.    Rickie Moore' Narcotic Policy  Patients will only be prescribed narcotics after surgical procedures or significant injury. Not all procedures cause pain great enough to require Narcotics and thus, not all patients will receive prescriptions after surgical procedures or injuries. Narcotics are never prescribed for chronic conditions. Narcotics are never prescribed for use longer than one week at a time. Refills are only granted in unusual circumstances and only at Dr. Tawanda Joshi discretion. Patients who are receiving narcotic medication from another physician or who are under pain management contracts will not be given a prescription for narcotics for any reason. Surgery Arrival Time:  You have been advised of the START TIME for your surgery as well as the ARRIVAL TIME at which you need to arrive at the surgery facility. Please understand that there is a certain amount of preparation which takes place at the surgery facility prior to the start of your surgery. If you arrive later than your scheduled ARRIVAL TIME, it may be necessary to cancel your surgery for that day and reschedule your procedure due to lack of adequate time for pre-surgery preparations. Thank you for being on time for your arrival.    I have read the above policies and understand that by agreeing to proceed with treatment by Dr. Sherry Rosario and his team, that I am agreeing to abide by these policies.   Patient Name:  Viral Sandovla    Patient Signature:  _____________________________    Tristin Lazo Date:   12/8/21

## 2021-12-16 ENCOUNTER — TELEPHONE (OUTPATIENT)
Dept: ORTHOPEDIC SURGERY | Age: 56
End: 2021-12-16

## 2021-12-16 NOTE — TELEPHONE ENCOUNTER
SCANNED INTO MEDIA COMPLETED FMLA FOR [facility] FOR REVIEW, SIGN (CBW) & RELEASE.  ALERTED [facility] CLINICAL STAFF THRU POOL

## 2021-12-22 NOTE — PROGRESS NOTES
James Locket    Age 61 y.o.    female    1965    MRN [id number]    1/4/2022  Arrival Time_____________  OR Time____________[address]     Procedure(s):  RIGHT MIDDLE FINGER TRIGGER FINGER RELEASE                      Monitor Anesthesia Care    Surgeon(s):  Elysia Mcclure, MD       Phone [phone] (home)     [address]  Cell         Work  _____________________________________________________________________  _____________________________________________________________________  _____________________________________________________________________  _____________________________________________________________________  _____________________________________________________________________    PCP _____________________________ Phone_________________     H&P__________________Bringing      Chart            Epic   DOS      Called________  EKG__________________Bringing      Chart            Epic   DOS      Called________  LAB__________________ Bringing      Chart            Epic   DOS      Called________  Cardiac Clearance_______Bringing      Chart            Epic      DOS      Called________    Cardiologist________________________ Phone___________________________    ? Restorationist concerns / Waiver on Chart            PAT Communications________________  ? Pre-op Instructions Given [place]          _________________________________  ? Directions to Surgery Center                          _________________________________  ? Transportation Home_______________      __________________________________  ?  Crutches/Walker__________________        __________________________________    ________Pre-op Orders   _______Transcribed    _______Wt.  ________Pharmacy          _______SCD  ______VTE     ______TED [place] Clock  _______  Surgery Consent    _______  Anesthesia Consent         COVID DATE______________LOCATION________________ RESULT__________

## 2021-12-27 ENCOUNTER — TELEPHONE (OUTPATIENT)
Dept: ORTHOPEDIC SURGERY | Age: 56
End: 2021-12-27

## 2021-12-27 NOTE — PROGRESS NOTES
Obstructive Sleep Apnea (MERRICK) Screening     Patient:  Fausto Allen    YOB: 1965      Medical Record #:  [id number]                     Date:  12/27/2021     1. Are you a loud and/or regular snorer? []  Yes       [x] No    2. Have you been observed to gasp or stop breathing during sleep? []  Yes       [x] No    3. Do you feel tired or groggy upon awakening or do you awaken with a headache?           []  Yes       [] No    4. Are you often tired or fatigued during the wake time hours? []  Yes       [] No    5. Do you fall asleep sitting, reading, watching TV or driving? []  Yes       [] No    6. Do you often have problems with memory or concentration? []  Yes       [] No    **If patient's score is ? 3 they are considered high risk for MERRICK. An Anesthesia provider will evaluate the patient and develop a plan of care the day of surgery. Note:  If the patient's BMI is more than 35 kg m¯² , has neck circumference > 40 cm, and/or high blood pressure the risk is greater (© American Sleep Apnea Association, 2006).

## 2021-12-27 NOTE — TELEPHONE ENCOUNTER
CPT: 97898  BODY PART: right finger  STATUS: outpatient  AUTHORIZATION: NPR    Per [facility] website, [address]

## 2021-12-29 ENCOUNTER — TELEPHONE (OUTPATIENT)
Dept: FAMILY MEDICINE CLINIC | Age: 56
End: 2021-12-29

## 2021-12-29 ENCOUNTER — TELEPHONE (OUTPATIENT)
Dept: ORTHOPEDIC SURGERY | Age: 56
End: 2021-12-29

## 2021-12-29 DIAGNOSIS — Z11.52 ENCOUNTER FOR SCREENING FOR COVID-19: ICD-10-CM

## 2021-12-29 DIAGNOSIS — Z11.52 ENCOUNTER FOR SCREENING FOR COVID-19: Primary | ICD-10-CM

## 2021-12-29 NOTE — TELEPHONE ENCOUNTER
I talked to Cordell Watson at the lab and patient is having surgery and needs a test done. Order put in [address] [place] [address] Rd.

## 2021-12-29 NOTE — TELEPHONE ENCOUNTER
Bjorn Schumacher with [facility] [phone] is calling to get an order for a COVID test for the patient. She would like a call when this is put in.     Please advise     Provider out of the office

## 2021-12-29 NOTE — TELEPHONE ENCOUNTER
General Question     Subject: patient needs order for covid test sent [facility] \"[facility]\"  Ph: [phone]  Patient: Ramona Fatima  Contact Number: [phone]

## 2021-12-30 ENCOUNTER — ANESTHESIA EVENT (OUTPATIENT)
Dept: OPERATING ROOM | Age: 56
End: 2021-12-30
Payer: COMMERCIAL

## 2021-12-30 ENCOUNTER — OFFICE VISIT (OUTPATIENT)
Dept: FAMILY MEDICINE CLINIC | Age: 56
End: 2021-12-30
Payer: COMMERCIAL

## 2021-12-30 VITALS
WEIGHT: 146 LBS | TEMPERATURE: 97.1 F | SYSTOLIC BLOOD PRESSURE: 138 MMHG | HEART RATE: 78 BPM | DIASTOLIC BLOOD PRESSURE: 76 MMHG | BODY MASS INDEX: 23.93 KG/M2 | OXYGEN SATURATION: 99 %

## 2021-12-30 DIAGNOSIS — M65.331 TRIGGER FINGER, RIGHT MIDDLE FINGER: ICD-10-CM

## 2021-12-30 DIAGNOSIS — Z01.818 PREOP EXAMINATION: Primary | ICD-10-CM

## 2021-12-30 DIAGNOSIS — I10 ESSENTIAL HYPERTENSION: ICD-10-CM

## 2021-12-30 LAB — SARS-COV-2: NOT DETECTED

## 2021-12-30 PROCEDURE — 93000 ELECTROCARDIOGRAM COMPLETE: CPT | Performed by: FAMILY MEDICINE

## 2021-12-30 PROCEDURE — 99241 PR OFFICE CONSULTATION NEW/ESTAB PATIENT 15 MIN: CPT | Performed by: FAMILY MEDICINE

## 2021-12-30 PROCEDURE — G8428 CUR MEDS NOT DOCUMENT: HCPCS | Performed by: FAMILY MEDICINE

## 2021-12-30 PROCEDURE — G8420 CALC BMI NORM PARAMETERS: HCPCS | Performed by: FAMILY MEDICINE

## 2021-12-30 PROCEDURE — G8484 FLU IMMUNIZE NO ADMIN: HCPCS | Performed by: FAMILY MEDICINE

## 2021-12-30 NOTE — PROGRESS NOTES
Preoperative Consultation    Chief Complaint   Patient presents with    Pre-op Exam       This patient presents to the office today for a preoperative consultation at the request of surgeon, Dr. Ajith Millard, who plans on performing trigger finger release right middle finger on January 5 at [facility]. Planned anesthesia: General   Known anesthesia problems: nausea when coming out, yes, both mom and sister gets nausea coming out of anesthesia family history of anesthesia problems.   Bleeding risk: No recent or remote history of abnormal bleeding  Personal or FH of DVT/PE: No       Internal Administration   First Dose COVID-19, Pfizer, PF, 30mcg/0.3mL  02/05/2021   Second Dose COVID-19, Pfizer, PF, 30mcg/0.3mL   02/24/2021       Last COVID Lab SARS-CoV-2 (no units)   Date Value   12/29/2021 Not Detected      + booster in october        Patient Active Problem List   Diagnosis    Pure hypercholesterolemia    Heartburn    History of parathyroid surgery    Nephrolithiasis    Menopausal state    Essential hypertension       Past Medical History:   Diagnosis Date    Anxiety     Arthritis     Depression     Essential hypertension 11/10/2017    Hematuria     negative workup - [place]    Hyperparathyroidism ([address] 2/3/2017    Kidney stones     Moderate episode of recurrent major depressive disorder ([address] 1/6/2017    Nephrolithiasis 4/5/2017    Palpitations     PONV (postoperative nausea and vomiting)     Pure hypercholesterolemia     Urethral stricture      Past Surgical History:   Procedure Laterality Date    COLONOSCOPY N/A 5/13/2019    COLONOSCOPY POLYPECTOMY SNARE/COLD BIOPSY performed by Ancelmo Montague MD at [address] [place] [address]      multiple    KIDNEY STONE SURGERY      PARATHYROIDECTOMY  05/0418    UMBILICAL HERNIA REPAIR       Family History   Problem Relation Age of Onset    Diabetes Mother     Hypertension Mother     High Cholesterol Mother     Coronary Art Dis Father     Prostate Cancer Father     Hypertension Father     Hypertension Sister    [facility] Elevated Lipids Sister     Breast Cancer Paternal Aunt     Cancer Paternal Aunt         pancreatic    Breast Cancer Paternal Aunt     Hypertension Sister     Elevated Lipids Sister     Hypertension Sister     Elevated Lipids Sister        Allergies   Allergen Reactions    Sulfa Antibiotics Nausea And Vomiting     No outpatient medications have been marked as taking for the 21 encounter (Office Visit) with Hortencia Lucio MD.       Social History     Tobacco Use    Smoking status: Former Smoker     Quit date: 10/5/2017     Years since quittin.2    Smokeless tobacco: Never Used   Substance Use Topics    Alcohol use: Yes     Comment: 1-2 drinks per year         REVIEW OF SYSTEMS:    Constitutional:  Feeling well, No fever, chills, no weight change, no fatigue  Eyes:  no vision loss/disturbance  Ears, nose, mouth, throat, and face:    No hearing change, no sore throat, no rhinorrhea, no nasal congestion  Respiratory:   no cough, no shortness of breath, no dyspnea on exertion,   Cardiovascular:   No chest pain, no palpitations, no irregular heart beat, no edema  Gastrointestinal:   No change in bowels, no pain, no nausea or vomiting, no blood or black tarry stool  Genitourinary:   No change in bladder, no nocturia  Hematologic/lymphatic:   No bleeding, no easy bruising  Musculoskeletal:    No joint pain   Behavioral/Psych:    No depression, no anxiety  Skin:    No rashes, no new lesions    PHYSICAL EXAM  Vitals:    21 1437   BP: 138/76   Site: Left Upper Arm   Position: Sitting   Cuff Size: Medium Adult   Pulse: 78   Temp: 97.1 °F (36.2 °C)   TempSrc: Infrared   SpO2: 99%   Weight: 146 lb (66.2 kg)     Body mass index is 23.93 kg/m². CONSTITUTIONAL: Alert and oriented x 4 NAD, affect appropriate and normal appearing weight, well hydrated, well developed.     Eyes:  Lids and lashes normal, pupils equal, round and reactive to light, extra ocular muscles intact, sclera clear, conjunctiva normal    Head/ENT:  Normocephalic, without obvious abnormality, atramatic, external ears without lesions, Canals normal, TM clear bilaterally, oral pharynx with moist mucus membranes, tonsils without erythema or exudates, gums normal and good dentition. Heart: Regular rate and rhythm, normal S1 and S2, and no murmur noted    Lungs:  No increased work of breathing, good air exchange, clear to auscultation bilaterally    Abdomen:  Normal bowel sounds, soft, non-distended, non-tender, no masses palpated, no hepatosplenomegally    Extremities:  No loss of hair, no edema, nl pedal pulses bilaterally, no skin lesions    NEUROLOGIC:Cranial nerves II-XII are grossly intact. Motor is 5 out of 5 bilaterally. EKG Interpretation:  normal sinus rhythm, low voltage precordial, unchanged from previous tracings.        Assessment:           ASSESSMENT AND PLAN:       Jack Lopez was seen today for pre-op exam.    Diagnoses and all orders for this visit:    Preop examination  -     EKG 12 Lead  Medically stable for planned procedure    Trigger finger, right middle finger    Essential hypertension  -     EKG 12 Lead            Portions of Note per  Janae Ramos CMA AAMA with corrections and edits per Jesús Anna MD.  I agree with entirety of note and was present and performed history and physical.  I also confirm that the note above accurately reflects all work, treatment, procedures, and medical decision making performed by me, Jesús Anna MD

## 2022-01-04 ENCOUNTER — HOSPITAL ENCOUNTER (OUTPATIENT)
Age: 57
Setting detail: OUTPATIENT SURGERY
Discharge: HOME | End: 2022-01-04
Attending: ORTHOPAEDIC SURGERY | Admitting: ORTHOPAEDIC SURGERY
Payer: COMMERCIAL

## 2022-01-04 ENCOUNTER — ANESTHESIA (OUTPATIENT)
Dept: OPERATING ROOM | Age: 57
End: 2022-01-04
Payer: COMMERCIAL

## 2022-01-04 VITALS — SYSTOLIC BLOOD PRESSURE: 147 MMHG | OXYGEN SATURATION: 98 % | DIASTOLIC BLOOD PRESSURE: 80 MMHG

## 2022-01-04 VITALS
OXYGEN SATURATION: 97 % | BODY MASS INDEX: 22.18 KG/M2 | RESPIRATION RATE: 16 BRPM | DIASTOLIC BLOOD PRESSURE: 76 MMHG | SYSTOLIC BLOOD PRESSURE: 128 MMHG | TEMPERATURE: 96.8 F | HEART RATE: 78 BPM | WEIGHT: 138 LBS | HEIGHT: 66 IN

## 2022-01-04 PROCEDURE — 2709999900 HC NON-CHARGEABLE SUPPLY: Performed by: ORTHOPAEDIC SURGERY

## 2022-01-04 PROCEDURE — 7100000010 HC PHASE II RECOVERY - FIRST 15 MIN: Performed by: ORTHOPAEDIC SURGERY

## 2022-01-04 PROCEDURE — 3700000001 HC ADD 15 MINUTES (ANESTHESIA): Performed by: ORTHOPAEDIC SURGERY

## 2022-01-04 PROCEDURE — 2500000003 HC RX 250 WO HCPCS: Performed by: ORTHOPAEDIC SURGERY

## 2022-01-04 PROCEDURE — 2500000003 HC RX 250 WO HCPCS: Performed by: NURSE ANESTHETIST, CERTIFIED REGISTERED

## 2022-01-04 PROCEDURE — 6360000002 HC RX W HCPCS: Performed by: NURSE ANESTHETIST, CERTIFIED REGISTERED

## 2022-01-04 PROCEDURE — 7100000011 HC PHASE II RECOVERY - ADDTL 15 MIN: Performed by: ORTHOPAEDIC SURGERY

## 2022-01-04 PROCEDURE — 26055 INCISE FINGER TENDON SHEATH: CPT | Performed by: ORTHOPAEDIC SURGERY

## 2022-01-04 PROCEDURE — 2580000003 HC RX 258: Performed by: ANESTHESIOLOGY

## 2022-01-04 PROCEDURE — 3600000003 HC SURGERY LEVEL 3 BASE: Performed by: ORTHOPAEDIC SURGERY

## 2022-01-04 PROCEDURE — 2580000003 HC RX 258: Performed by: NURSE ANESTHETIST, CERTIFIED REGISTERED

## 2022-01-04 PROCEDURE — 3700000000 HC ANESTHESIA ATTENDED CARE: Performed by: ORTHOPAEDIC SURGERY

## 2022-01-04 PROCEDURE — 3600000013 HC SURGERY LEVEL 3 ADDTL 15MIN: Performed by: ORTHOPAEDIC SURGERY

## 2022-01-04 RX ORDER — SODIUM CHLORIDE 0.9 % (FLUSH) 0.9 %
5-40 SYRINGE (ML) INJECTION EVERY 12 HOURS SCHEDULED
Status: DISCONTINUED | OUTPATIENT
Start: 2022-01-04 | End: 2022-01-04 | Stop reason: HOSPADM

## 2022-01-04 RX ORDER — MORPHINE SULFATE 2 MG/ML
2 INJECTION, SOLUTION INTRAMUSCULAR; INTRAVENOUS EVERY 5 MIN PRN
Status: DISCONTINUED | OUTPATIENT
Start: 2022-01-04 | End: 2022-01-04 | Stop reason: HOSPADM

## 2022-01-04 RX ORDER — LIDOCAINE HYDROCHLORIDE 20 MG/ML
INJECTION, SOLUTION EPIDURAL; INFILTRATION; INTRACAUDAL; PERINEURAL PRN
Status: DISCONTINUED | OUTPATIENT
Start: 2022-01-04 | End: 2022-01-04 | Stop reason: SDUPTHER

## 2022-01-04 RX ORDER — SODIUM CHLORIDE, SODIUM LACTATE, POTASSIUM CHLORIDE, CALCIUM CHLORIDE 600; 310; 30; 20 MG/100ML; MG/100ML; MG/100ML; MG/100ML
INJECTION, SOLUTION INTRAVENOUS CONTINUOUS
Status: DISCONTINUED | OUTPATIENT
Start: 2022-01-04 | End: 2022-01-04 | Stop reason: HOSPADM

## 2022-01-04 RX ORDER — MORPHINE SULFATE 2 MG/ML
1 INJECTION, SOLUTION INTRAMUSCULAR; INTRAVENOUS EVERY 5 MIN PRN
Status: DISCONTINUED | OUTPATIENT
Start: 2022-01-04 | End: 2022-01-04 | Stop reason: HOSPADM

## 2022-01-04 RX ORDER — SODIUM CHLORIDE, SODIUM LACTATE, POTASSIUM CHLORIDE, CALCIUM CHLORIDE 600; 310; 30; 20 MG/100ML; MG/100ML; MG/100ML; MG/100ML
INJECTION, SOLUTION INTRAVENOUS CONTINUOUS PRN
Status: DISCONTINUED | OUTPATIENT
Start: 2022-01-04 | End: 2022-01-04 | Stop reason: SDUPTHER

## 2022-01-04 RX ORDER — ONDANSETRON 2 MG/ML
4 INJECTION INTRAMUSCULAR; INTRAVENOUS PRN
Status: DISCONTINUED | OUTPATIENT
Start: 2022-01-04 | End: 2022-01-04 | Stop reason: HOSPADM

## 2022-01-04 RX ORDER — OXYCODONE HYDROCHLORIDE AND ACETAMINOPHEN 5; 325 MG/1; MG/1
1 TABLET ORAL PRN
Status: DISCONTINUED | OUTPATIENT
Start: 2022-01-04 | End: 2022-01-04 | Stop reason: HOSPADM

## 2022-01-04 RX ORDER — SODIUM CHLORIDE 9 MG/ML
25 INJECTION, SOLUTION INTRAVENOUS PRN
Status: DISCONTINUED | OUTPATIENT
Start: 2022-01-04 | End: 2022-01-04 | Stop reason: HOSPADM

## 2022-01-04 RX ORDER — SODIUM CHLORIDE 0.9 % (FLUSH) 0.9 %
5-40 SYRINGE (ML) INJECTION PRN
Status: DISCONTINUED | OUTPATIENT
Start: 2022-01-04 | End: 2022-01-04 | Stop reason: HOSPADM

## 2022-01-04 RX ORDER — LABETALOL HYDROCHLORIDE 5 MG/ML
5 INJECTION, SOLUTION INTRAVENOUS EVERY 10 MIN PRN
Status: DISCONTINUED | OUTPATIENT
Start: 2022-01-04 | End: 2022-01-04 | Stop reason: HOSPADM

## 2022-01-04 RX ORDER — HYDRALAZINE HYDROCHLORIDE 20 MG/ML
5 INJECTION INTRAMUSCULAR; INTRAVENOUS EVERY 10 MIN PRN
Status: DISCONTINUED | OUTPATIENT
Start: 2022-01-04 | End: 2022-01-04 | Stop reason: HOSPADM

## 2022-01-04 RX ORDER — DIPHENHYDRAMINE HYDROCHLORIDE 50 MG/ML
12.5 INJECTION INTRAMUSCULAR; INTRAVENOUS
Status: DISCONTINUED | OUTPATIENT
Start: 2022-01-04 | End: 2022-01-04 | Stop reason: HOSPADM

## 2022-01-04 RX ORDER — OXYCODONE HYDROCHLORIDE AND ACETAMINOPHEN 5; 325 MG/1; MG/1
2 TABLET ORAL PRN
Status: DISCONTINUED | OUTPATIENT
Start: 2022-01-04 | End: 2022-01-04 | Stop reason: HOSPADM

## 2022-01-04 RX ORDER — PROPOFOL 10 MG/ML
INJECTION, EMULSION INTRAVENOUS PRN
Status: DISCONTINUED | OUTPATIENT
Start: 2022-01-04 | End: 2022-01-04 | Stop reason: SDUPTHER

## 2022-01-04 RX ORDER — PROMETHAZINE HYDROCHLORIDE 25 MG/ML
6.25 INJECTION, SOLUTION INTRAMUSCULAR; INTRAVENOUS
Status: DISCONTINUED | OUTPATIENT
Start: 2022-01-04 | End: 2022-01-04 | Stop reason: HOSPADM

## 2022-01-04 RX ORDER — MEPERIDINE HYDROCHLORIDE 50 MG/ML
12.5 INJECTION INTRAMUSCULAR; INTRAVENOUS; SUBCUTANEOUS EVERY 5 MIN PRN
Status: DISCONTINUED | OUTPATIENT
Start: 2022-01-04 | End: 2022-01-04 | Stop reason: HOSPADM

## 2022-01-04 RX ORDER — LIDOCAINE HYDROCHLORIDE 10 MG/ML
0.3 INJECTION, SOLUTION EPIDURAL; INFILTRATION; INTRACAUDAL; PERINEURAL
Status: DISCONTINUED | OUTPATIENT
Start: 2022-01-04 | End: 2022-01-04 | Stop reason: HOSPADM

## 2022-01-04 RX ADMIN — PROPOFOL 200 MG: 10 INJECTION, EMULSION INTRAVENOUS at 09:06

## 2022-01-04 RX ADMIN — SODIUM CHLORIDE, SODIUM LACTATE, POTASSIUM CHLORIDE, AND CALCIUM CHLORIDE: .6; .31; .03; .02 INJECTION, SOLUTION INTRAVENOUS at 09:03

## 2022-01-04 RX ADMIN — SODIUM CHLORIDE, POTASSIUM CHLORIDE, SODIUM LACTATE AND CALCIUM CHLORIDE: 600; 310; 30; 20 INJECTION, SOLUTION INTRAVENOUS at 08:28

## 2022-01-04 RX ADMIN — PROPOFOL 50 MG: 10 INJECTION, EMULSION INTRAVENOUS at 09:09

## 2022-01-04 RX ADMIN — LIDOCAINE HYDROCHLORIDE 60 MG: 20 INJECTION, SOLUTION EPIDURAL; INFILTRATION; INTRACAUDAL; PERINEURAL at 09:06

## 2022-01-04 RX ADMIN — PROPOFOL 50 MG: 10 INJECTION, EMULSION INTRAVENOUS at 09:13

## 2022-01-04 ASSESSMENT — PULMONARY FUNCTION TESTS
PIF_VALUE: 1
PIF_VALUE: 2
PIF_VALUE: 2
PIF_VALUE: 1

## 2022-01-04 NOTE — OP NOTE
OPERATIVE REPORT          Patient:  Vernon Mendez    YOB: 1965  Date of Service:  1/4/2022   Location:  [facility]        Preoperative Diagnosis:  Right Middle Finger trigger finger. Postoperative Diagnosis: Same. Procedure: Right Middle Finger trigger finger release (A1 pulley release). Surgeon:    Maynor Buitrago. Ifeoma Marks MD    Surgical Assistant:    KATIE Bennett Assistant    Anesthesia:  Local with sedation. Blood Loss:  Minimal.     Complications:  None. Tourniquet Time:  2 minutes. Indications:  Ms. Vernon Mendez  is a 61y.o.  year old female with a Right Middle Finger trigger finger. I have discussed preoperatively with her the complications, limitations, expectations, alternatives and risks of the planned surgical care. She has voiced an understanding of our discussion. All of her questions have been fully answered to her satisfaction, and she has provided written informed consent to proceed. Procedure:  After written consent was obtained and the proper operative site was identified and marked, Ms. Vernon Mendez  was brought to the operating room, placed in the supine position on the operating room table with the Right arm extended upon a hand table. Under an appropriate level of sedation, local anesthetic (1% Lidocaine and 1/2% Marcaine both without Epinephrine) was instilled in the planned surgical field. Her Right upper extremity was prepped and draped in the usual sterile fashion. After Eshmarch exsanguination the pneumo-tourniquet was inflated to 250 milimeters of mercury. A 1 centimeter oblique incision was fashioned over the base of the flexor tendon sheath of the Right Middle Finger. Dissection was carried carefully through the subcutaneous tissues, taking great care to identify and protect the neurovascular structures. The flexor tendon sheath was carefully identified and cleared of surrounding soft tissue.  The A1 pulley was

## 2022-01-04 NOTE — ANESTHESIA PRE PROCEDURE
Department of Anesthesiology  Preprocedure Note       Name:  Ness Ponce   Age:  61 y.o.  :  1965                                          MRN:  [id number]         Date:  2022      Surgeon: Laurita Whaley):  Isaac Richards MD    Procedure: Procedure(s):  RIGHT MIDDLE FINGER TRIGGER FINGER RELEASE    Medications prior to admission:   Prior to Admission medications    Medication Sig Start Date End Date Taking? Authorizing Provider   lansoprazole (PREVACID) 30 MG delayed release capsule Take 1 capsule by mouth daily 21  Yes Jeanie Fox MD   amLODIPine (NORVASC) 5 MG tablet TAKE 1 TABLET DAILY 21  Yes Jeanie Fox MD   atorvastatin (LIPITOR) 40 MG tablet Take 1 tablet by mouth daily 21  Yes Jeanie Fox MD       Current medications:    Current Facility-Administered Medications   Medication Dose Route Frequency Provider Last Rate Last Admin    lactated ringers infusion   IntraVENous Continuous Rl Godfrey  mL/hr at 22 0828 New Bag at 22 0828    sodium chloride flush 0.9 % injection 5-40 mL  5-40 mL IntraVENous 2 times per day Rl Godfrey MD        sodium chloride flush 0.9 % injection 5-40 mL  5-40 mL IntraVENous PRN Rl Godfrey MD        0.9 % sodium chloride infusion  25 mL IntraVENous PRN Rl Godfrey MD        lidocaine PF 1 % injection 0.3 mL  0.3 mL IntraDERmal Once PRN Rl Godfrey MD           Allergies:     Allergies   Allergen Reactions    Sulfa Antibiotics Nausea And Vomiting       Problem List:    Patient Active Problem List   Diagnosis Code    Pure hypercholesterolemia E78.00    Heartburn R12    History of parathyroid surgery Z98.890    Nephrolithiasis N20.0    Menopausal state N95.1    Essential hypertension I10       Past Medical History:        Diagnosis Date    Anxiety     Arthritis     Depression     Essential hypertension 11/10/2017    Hematuria     negative workup - [place]    Hyperparathyroidism ([facility] [address] 2/3/2017    Kidney stones     Moderate episode of recurrent major depressive disorder ([facility] [address] 2017    Nephrolithiasis 2017    Palpitations     PONV (postoperative nausea and vomiting)     Pure hypercholesterolemia     Urethral stricture        Past Surgical History:        Procedure Laterality Date    COLONOSCOPY N/A 2019    COLONOSCOPY POLYPECTOMY SNARE/COLD BIOPSY performed by Jessica Madsen MD at [address] [place] [address]      multiple    KIDNEY STONE SURGERY      PARATHYROIDECTOMY  8966    UMBILICAL HERNIA REPAIR         Social History:    Social History     Tobacco Use    Smoking status: Former Smoker     Quit date: 10/5/2017     Years since quittin.2    Smokeless tobacco: Never Used   Substance Use Topics    Alcohol use: Yes     Comment: 1-2 drinks per year                                Counseling given: Not Answered      Vital Signs (Current):   Vitals:    21 1446 22 0826   BP:  119/75   Pulse:  81   Resp:  16   Temp:  96.8 °F (36 °C)   SpO2:  97%   Weight: 138 lb (62.6 kg)    Height: 5' 5.5\" (1.664 m)                                               BP Readings from Last 3 Encounters:   22 119/75   21 138/76   21 136/84       NPO Status: Time of last liquid consumption:                         Time of last solid consumption:                         Date of last liquid consumption: 22                        Date of last solid food consumption: 22    BMI:   Wt Readings from Last 3 Encounters:   21 138 lb (62.6 kg)   21 146 lb (66.2 kg)   21 138 lb (62.6 kg)     Body mass index is 22.62 kg/m².     CBC:   Lab Results   Component Value Date    WBC 7.2 2013    RBC 4.63 2013    HGB 13.7 2013    HCT 41.7 2013    MCV 90.2 2013    RDW 13.1 2013     2013       CMP:   Lab Results   Component Value Date     2021    K 4.3 2021     08/14/2021    CO2 27 08/14/2021    BUN 15 08/14/2021    CREATININE 0.7 08/14/2021    GFRAA >60 08/14/2021    GFRAA >60 03/16/2013    AGRATIO 2.1 08/14/2021    LABGLOM >60 08/14/2021    GLUCOSE 90 08/14/2021    PROT 7.1 08/14/2021    PROT 7.1 09/24/2011    CALCIUM 9.7 08/14/2021    BILITOT 0.4 08/14/2021    ALKPHOS 73 08/14/2021    AST 22 08/14/2021    ALT 15 08/14/2021       POC Tests: No results for input(s): POCGLU, POCNA, POCK, POCCL, POCBUN, POCHEMO, POCHCT in the last 72 hours. Coags: No results found for: PROTIME, INR, APTT    HCG (If Applicable):   Lab Results   Component Value Date    PREGTESTUR Neg 03/16/2013        ABGs: No results found for: PHART, PO2ART, KLC0UHU, WHA3KOS, BEART, A6UPKBXL     Type & Screen (If Applicable):  No results found for: LABABO, LABRH    Drug/Infectious Status (If Applicable):  No results found for: HIV, HEPCAB    COVID-19 Screening (If Applicable):   Lab Results   Component Value Date    COVID19 Not Detected 12/29/2021           Anesthesia Evaluation  Patient summary reviewed and Nursing notes reviewed   history of anesthetic complications: PONV. Airway: Mallampati: III  TM distance: >3 FB   Neck ROM: full  Mouth opening: > = 3 FB Dental: normal exam         Pulmonary:Negative Pulmonary ROS and normal exam  breath sounds clear to auscultation                             Cardiovascular:    (+) hypertension:,         Rhythm: regular  Rate: normal                    Neuro/Psych:   (+) psychiatric history:            GI/Hepatic/Renal:   (+) renal disease: kidney stones,           Endo/Other: Negative Endo/Other ROS                    Abdominal:             Vascular: negative vascular ROS. Other Findings:           Anesthesia Plan      MAC     ASA 2       Induction: intravenous. Anesthetic plan and risks discussed with patient. Plan discussed with CRNA.                   Miller Savage MD   1/4/2022

## 2022-01-04 NOTE — ANESTHESIA POSTPROCEDURE EVALUATION
Department of Anesthesiology  Postprocedure Note    Patient: Leon Cheema  MRN: [id number]  [place]: 1965  Date of evaluation: 1/4/2022  Time:  9:58 AM     Procedure Summary     Date: 01/04/22 Room / Location: [address]    Anesthesia Start: 4577 Anesthesia Stop: 2191    Procedure: RIGHT MIDDLE FINGER TRIGGER FINGER RELEASE (Right Fingers) Diagnosis:       Trigger middle finger of right hand      (Trigger middle finger of right hand [M65.331])    Surgeons: Maico Whitlock MD Responsible Provider: Will Toro MD    Anesthesia Type: MAC ASA Status: 2          Anesthesia Type: MAC    Bob Phase I: Bob Score: 10    Bob Phase II: Bob Score: 10    Last vitals: Reviewed and per EMR flowsheets.        Anesthesia Post Evaluation    Patient location during evaluation: PACU  Patient participation: complete - patient participated  Level of consciousness: awake and alert  Pain score: 0  Airway patency: patent  Nausea & Vomiting: no nausea and no vomiting  Complications: no  Cardiovascular status: blood pressure returned to baseline  Respiratory status: acceptable  Hydration status: stable

## 2022-01-10 ENCOUNTER — TELEPHONE (OUTPATIENT)
Dept: ORTHOPEDIC SURGERY | Age: 57
End: 2022-01-10

## 2022-01-10 NOTE — TELEPHONE ENCOUNTER
Please call patient at [phone], states her HR dept cant read the ppwk that was faxed and cant find CBW signature on it.    H& [id number]        HR fax [phone]

## 2022-01-19 ENCOUNTER — OFFICE VISIT (OUTPATIENT)
Dept: ORTHOPEDIC SURGERY | Age: 57
End: 2022-01-19

## 2022-01-19 VITALS — WEIGHT: 146 LBS | RESPIRATION RATE: 16 BRPM | BODY MASS INDEX: 23.46 KG/M2 | HEIGHT: 66 IN

## 2022-01-19 DIAGNOSIS — M65.30 TRIGGER FINGER, ACQUIRED: Primary | ICD-10-CM

## 2022-01-19 PROCEDURE — 99024 POSTOP FOLLOW-UP VISIT: CPT | Performed by: PHYSICIAN ASSISTANT

## 2022-01-19 NOTE — PATIENT INSTRUCTIONS
Postoperative Instructions After Trigger Finger Release    Dr. Ava Kaba          1. After bandages are removed one week from surgery, you may chose to wear a small bandage over the incision if you wish, though you do not need to. 2. Keep incision dry until sutures have fully dissolved  or it has been 14 days since your surgery. Thereafter, you may wash with mild soap and water and shower normally. 3. Once your stiches have fully disappeared & skin appears normal, you should begin gently massaging the incision with Vitamin E (may use Vitamin E lotion or contents of Vitamin E capsule). 4. Work hard on motion of the fingers and wrist, straightening each finger fully and bending each finger fully, bending wrist forward and bending wrist backwards. Do not be concerned if you experience discomfort. This will not damage the surgery. 5. You may begin using the hand as it feels comfortable beginning 12-14 days from the day of surgery. You may not feel entirely comfortable gripping or lifting heavy objects for several weeks. 6. You may expect to see some skin peel off around the incision. You may be left with a small area of pink baby skin. This is quite normal.    Thank you for choosing [facility]) Physicians for your Hand and Upper Extremity needs. If we can be of any further assistance to you, please do not hesitate to contact us.     Office Phone Number:  [phone]  or  [phone]

## 2022-01-19 NOTE — LETTER
[address] [place] [address] [facility] [address]  Phone: [phone]  Fax: [phone]    Babatunde Srinivasan        January 19, 2022     Patient: James Wilson   YOB: 1965   Date of Visit: 1/19/2022       To Whom it May Concern:    James Wilson was seen in my clinic on 1/19/2022. She is under a restriction of no use of right hand for 3 weeks from date of surgery (01/04/2022). She has no restrictions starting on 01/25/2022. If you have any questions or concerns, please don't hesitate to call.     Sincerely,         Denise Merrill PA-C

## 2022-02-14 ENCOUNTER — TELEPHONE (OUTPATIENT)
Dept: ORTHOPEDIC SURGERY | Age: 57
End: 2022-02-14

## 2022-03-14 RX ORDER — PANTOPRAZOLE SODIUM 40 MG/1
40 TABLET, DELAYED RELEASE ORAL
Qty: 90 TABLET | Refills: 1 | Status: SHIPPED | OUTPATIENT
Start: 2022-03-14 | End: 2022-08-29

## 2022-03-14 NOTE — TELEPHONE ENCOUNTER
Received fax from [address] [facility] on patient's Lansoprazole not being covered. Dr. Barlow Pack changed to Pantoprazole. Ok to send. Fax scanned to encounter.

## 2022-03-15 ENCOUNTER — HOSPITAL ENCOUNTER (OUTPATIENT)
Dept: GENERAL RADIOLOGY | Age: 57
Discharge: HOME | End: 2022-03-15
Payer: COMMERCIAL

## 2022-03-15 ENCOUNTER — HOSPITAL ENCOUNTER (OUTPATIENT)
Age: 57
Discharge: HOME | End: 2022-03-15
Payer: COMMERCIAL

## 2022-03-15 ENCOUNTER — OFFICE VISIT (OUTPATIENT)
Dept: FAMILY MEDICINE CLINIC | Age: 57
End: 2022-03-15
Payer: COMMERCIAL

## 2022-03-15 VITALS
OXYGEN SATURATION: 98 % | BODY MASS INDEX: 24.25 KG/M2 | SYSTOLIC BLOOD PRESSURE: 136 MMHG | TEMPERATURE: 97.5 F | HEART RATE: 85 BPM | DIASTOLIC BLOOD PRESSURE: 70 MMHG | WEIGHT: 148 LBS

## 2022-03-15 DIAGNOSIS — R20.0 RIGHT ARM NUMBNESS: ICD-10-CM

## 2022-03-15 DIAGNOSIS — M25.511 CHRONIC RIGHT SHOULDER PAIN: Primary | ICD-10-CM

## 2022-03-15 DIAGNOSIS — G89.29 CHRONIC RIGHT SHOULDER PAIN: Primary | ICD-10-CM

## 2022-03-15 DIAGNOSIS — M25.511 CHRONIC RIGHT SHOULDER PAIN: ICD-10-CM

## 2022-03-15 DIAGNOSIS — G89.29 CHRONIC RIGHT SHOULDER PAIN: ICD-10-CM

## 2022-03-15 PROCEDURE — 72050 X-RAY EXAM NECK SPINE 4/5VWS: CPT

## 2022-03-15 PROCEDURE — 73030 X-RAY EXAM OF SHOULDER: CPT

## 2022-03-15 PROCEDURE — 99213 OFFICE O/P EST LOW 20 MIN: CPT | Performed by: NURSE PRACTITIONER

## 2022-03-15 PROCEDURE — 73070 X-RAY EXAM OF ELBOW: CPT

## 2022-03-15 RX ORDER — MELOXICAM 15 MG/1
15 TABLET ORAL DAILY PRN
Qty: 30 TABLET | Refills: 0 | Status: SHIPPED | OUTPATIENT
Start: 2022-03-15

## 2022-03-15 NOTE — PROGRESS NOTES
Lethia Cough  : 1965  Encounter date: 3/15/2022    This anthony 61 y.o. female who presents with  Chief Complaint   Patient presents with    Numbness     Right arm-elbow/shoulder pain       History of present illness:    HPI Pt is 61year old female with chronic R arm numbness, elbow and shoulder pain started 2-3 months ago. Pt reports strenuous work with heavy lifting and swinging. Denies known trauma. Pt with pain predominantly in R shoulder, reports limited ROM, difficulty reaching behind. Pt reports numbness radiates down to hand. Reports trigger finger release R hand with Dr. Adelaide Kinsey. Pt has not had EMG study completed. Denies clicking or popping in shoulder. Pt reports chronic R sided cervical pain. Current Outpatient Medications on File Prior to Visit   Medication Sig Dispense Refill    pantoprazole (PROTONIX) 40 MG tablet Take 1 tablet by mouth every morning (before breakfast) 90 tablet 1    lansoprazole (PREVACID) 30 MG delayed release capsule Take 1 capsule by mouth daily 90 capsule 3    amLODIPine (NORVASC) 5 MG tablet TAKE 1 TABLET DAILY 90 tablet 3    atorvastatin (LIPITOR) 40 MG tablet Take 1 tablet by mouth daily 90 tablet 3     No current facility-administered medications on file prior to visit.       Allergies   Allergen Reactions    Sulfa Antibiotics Nausea And Vomiting     Past Medical History:   Diagnosis Date    Anxiety     Arthritis     Depression     Essential hypertension 11/10/2017    Hematuria     negative workup - [place]    Hyperparathyroidism ([address] 2/3/2017    Kidney stones     Moderate episode of recurrent major depressive disorder ([facility] [address] 2017    Nephrolithiasis 2017    Palpitations     PONV (postoperative nausea and vomiting)     Pure hypercholesterolemia     Urethral stricture       Past Surgical History:   Procedure Laterality Date    COLONOSCOPY N/A 2019    COLONOSCOPY POLYPECTOMY SNARE/COLD BIOPSY performed by Ariana Coulter MD at [facility]    CYSTOURETHROSCOPY/URETHRAL DILATION      multiple    FINGER TRIGGER RELEASE Right 2022    RIGHT MIDDLE FINGER TRIGGER FINGER RELEASE performed by Gissel Loya MD at [address]      UMBILICAL HERNIA REPAIR        Family History   Problem Relation Age of Onset    Diabetes Mother     Hypertension Mother     High Cholesterol Mother     Coronary Art Dis Father     Prostate Cancer Father     Hypertension Father     Hypertension Sister    [place]in Elevated Lipids Sister     Breast Cancer Paternal Aunt     Cancer Paternal Aunt         pancreatic    Breast Cancer Paternal Aunt     Hypertension Sister     Elevated Lipids Sister     Hypertension Sister     Elevated Lipids Sister       Social History     Tobacco Use    Smoking status: Former Smoker     Quit date: 10/5/2017     Years since quittin.4    Smokeless tobacco: Never Used   Substance Use Topics    Alcohol use: Yes     Comment: 1-2 drinks per year        Review of Systems    Objective:    /70 (Site: Right Upper Arm, Position: Sitting, Cuff Size: Medium Adult)   Pulse 85   Temp 97.5 °F (36.4 °C)   Wt 148 lb (67.1 kg)   SpO2 98%   BMI 24.25 kg/m²   Weight: 148 lb (67.1 kg)     BP Readings from Last 3 Encounters:   03/15/22 136/70   22 (!) 147/80   22 128/76     Wt Readings from Last 3 Encounters:   03/15/22 148 lb (67.1 kg)   22 146 lb (66.2 kg)   21 138 lb (62.6 kg)     BMI Readings from Last 3 Encounters:   03/15/22 24.25 kg/m²   22 23.93 kg/m²   21 22.62 kg/m²       Physical Exam  Vitals reviewed. Constitutional:       Appearance: Normal appearance. She is well-developed. Cardiovascular:      Rate and Rhythm: Normal rate and regular rhythm. Pulses: Normal pulses. Heart sounds: Normal heart sounds. No murmur heard. Pulmonary:      Effort: Pulmonary effort is normal.      Breath sounds: Normal breath sounds. Musculoskeletal:         General: Tenderness (R shoulder/elbow, decreased ROM, weakness in ) present. No signs of injury. Cervical back: Normal range of motion. Tenderness (R sided) present. No rigidity. Skin:     General: Skin is warm and dry. Coloration: Skin is not pale. Findings: No bruising or erythema. Neurological:      Mental Status: She is alert and oriented to person, place, and time. Motor: Weakness present. Assessment/Plan    1. Chronic right shoulder pain  Discussed step wise approach  Consider PT, MRI, cortisone injection, ortho referral  Advised ice, rest  - XR SHOULDER RIGHT (MIN 2 VIEWS); Future  - XR ELBOW RIGHT (2 VIEWS); Future  - meloxicam (MOBIC) 15 MG tablet; Take 1 tablet by mouth daily as needed for Pain  Dispense: 30 tablet; Refill: 0  - diclofenac sodium (VOLTAREN) 1 % GEL; Apply 2 g topically 4 times daily  Dispense: 350 g; Refill: 0  - XR CERVICAL SPINE (4-5 VIEWS); Future    2. Right arm numbness  Discussed nerve involvement  Carpal vs ulnar vs cervical considerations  - XR ELBOW RIGHT (2 VIEWS); Future  - Kimberlyn Kirkland MD (Inpatient and Outpatient EMG), [facility]      Return if symptoms worsen or fail to improve, for unresolved symptoms. This dictation was generated by voice recognition computer software. Although all attempts are made to edit the dictation for accuracy, there may be errors in the transcription that are not intended.

## 2022-03-17 ENCOUNTER — PROCEDURE VISIT (OUTPATIENT)
Dept: NEUROLOGY | Age: 57
End: 2022-03-17
Payer: COMMERCIAL

## 2022-03-17 DIAGNOSIS — R20.0 RIGHT ARM NUMBNESS: Primary | ICD-10-CM

## 2022-03-17 PROCEDURE — 95886 MUSC TEST DONE W/N TEST COMP: CPT | Performed by: PSYCHIATRY & NEUROLOGY

## 2022-03-17 PROCEDURE — 95909 NRV CNDJ TST 5-6 STUDIES: CPT | Performed by: PSYCHIATRY & NEUROLOGY

## 2022-03-17 NOTE — PATIENT INSTRUCTIONS
Verbal consent was obtained from patient and/or patient's advocate for in office procedure with Dr. Bela Stark (EMG or EEG).

## 2022-03-17 NOTE — PROGRESS NOTES
Haroon Ferguson M.D. [facility]) Physicians/Center Neurology  Board Certified in [address] [facility] [address] [facility], [address]    EMG / NERVE CONDUCTION STUDY      PATIENT:  Erlin Diane       DATE OF EM22     YOB: 1965       REASON FOR EMG:   Right arm pain and numbness      REFERRING PHYSICIAN:  VINNY Machuca NP  [facility],  [facility]     SUMMARY:   The right median motor nerve study was normal.  The right median sensory nerve study had a prolonged distal latency. The right ulnar motor and sensory nerve studies were normal.  The right radial sensory nerve study was normal.  Needle EMG of several muscles in the right upper extremity was normal.  Needle EMG of the right cervical paraspinal muscles was normal.      CLINICAL DIAGNOSIS:  Neuropathy versus radiculopathy        EMG RESULTS:   This patient has a mild right median nerve lesion at the wrist.  (Carpal tunnel syndrome). There is no electrophysiological evidence for cervical radiculopathy in this study. ---------------------------------------------  Haroon Ferguson M.D.   Electromyographer / Neurologist

## 2022-04-07 ENCOUNTER — TELEPHONE (OUTPATIENT)
Dept: FAMILY MEDICINE CLINIC | Age: 57
End: 2022-04-07

## 2022-04-07 NOTE — TELEPHONE ENCOUNTER
Called Pt. Informed her of results. Stated she is going to go back to Dr Sejal Nice that did her hand surgery before. Will call back if she wants physical therapy. She had no further questions or concerns.

## 2022-04-07 NOTE — TELEPHONE ENCOUNTER
EMG study showed Right medium-moderate carpal tunnel and arthritis in neck. Can refer to hand specialist and and advise physical therapy.

## 2022-04-07 NOTE — TELEPHONE ENCOUNTER
Patient is calling stating that she saw Jackelin and she referred her to the neurologist. She saw them and now she is wondering what the next steps are going to be. She states that Jackelin mentioned something about an MRI.      Please advise

## 2022-06-06 RX ORDER — AMLODIPINE BESYLATE 5 MG/1
TABLET ORAL
Qty: 90 TABLET | Refills: 0 | Status: SHIPPED | OUTPATIENT
Start: 2022-06-06 | End: 2022-08-29

## 2022-06-06 RX ORDER — ATORVASTATIN CALCIUM 40 MG/1
40 TABLET, FILM COATED ORAL DAILY
Qty: 90 TABLET | Refills: 0 | Status: SHIPPED | OUTPATIENT
Start: 2022-06-06 | End: 2022-08-29

## 2022-06-06 NOTE — TELEPHONE ENCOUNTER
Medication:   Requested Prescriptions      No prescriptions requested or ordered in this encounter          Patient Phone Number: [phone] (home)     Last appt: 3/15/2022   Next appt: Visit date not found    Last OARRS: No flowsheet data found.   PDMP Monitoring:    Last PDMP Isma Jeffries as Reviewed [facility]):  Review User Review Instant Review Result          Preferred Pharmacy:   [facility] [id number] Zachariah Brown, [address]  [address] [place] [address]  [address]  Phone: [phone] Fax: [phone]    [facility] [id number] [facility] [place]  [phone] Jose Antonio Robert [phone]  [address] [place] [address]  Phone: [phone] Fax: [phone]    [facility] [address] [place] [address] [phone] Jose Antonio Robert [phone]  [address] [facility]  [address] [address]  Phone: [phone] Fax: [phone]

## 2022-06-09 ENCOUNTER — TELEPHONE (OUTPATIENT)
Dept: OTHER | Facility: CLINIC | Age: 57
End: 2022-06-09

## 2022-06-09 NOTE — TELEPHONE ENCOUNTER
Suma Yoder, Was contacted today as part of [facility] to schedule a mammogram.         Left VM for pt to return call to this nurse regarding routine health screenings. [employer] message also sent.      Valerie Gusman LPN

## 2022-08-29 RX ORDER — PANTOPRAZOLE SODIUM 40 MG/1
TABLET, DELAYED RELEASE ORAL
Qty: 90 TABLET | Refills: 1 | Status: SHIPPED | OUTPATIENT
Start: 2022-08-29

## 2022-08-29 RX ORDER — ATORVASTATIN CALCIUM 40 MG/1
TABLET, FILM COATED ORAL
Qty: 90 TABLET | Refills: 0 | Status: SHIPPED | OUTPATIENT
Start: 2022-08-29

## 2022-08-29 RX ORDER — AMLODIPINE BESYLATE 5 MG/1
TABLET ORAL
Qty: 90 TABLET | Refills: 0 | Status: SHIPPED | OUTPATIENT
Start: 2022-08-29

## 2022-08-29 NOTE — TELEPHONE ENCOUNTER
Medication:   Requested Prescriptions     Pending Prescriptions Disp Refills    pantoprazole (PROTONIX) 40 MG tablet [Pharmacy Med Name: PANTOPRAZOLE TAB 40MG DR] 90 tablet 1     Sig: TAKE 1 TABLET EVERY MORNINGBEFORE BREAKFAST    atorvastatin (LIPITOR) 40 MG tablet [Pharmacy Med Name: ATORVASTATIN TAB 40MG] 90 tablet 0     Sig: TAKE 1 TABLET DAILY    amLODIPine (NORVASC) 5 MG tablet [Pharmacy Med Name: AMLODIPINE TAB 5MG] 90 tablet 0     Sig: TAKE 1 TABLET DAILY          Patient Phone Number: [phone] (home)     Last appt: 3/15/2022   Next appt: Visit date not found    Last OARRS: No flowsheet data found.   PDMP Monitoring:    Last PDMP Vladimir Mckeon as Reviewed [facility]):  Review User Review Instant Review Result          Preferred Pharmacy:   [facility] [id number] Jade Ramirez, [address]  [address] [facility]  [address]  Phone: [phone] Fax: [phone]    [facility] [id number] [place] [address] [phone] [facility] [phone]  [address] [place] [address]  Phone: [phone] Fax: [phone]    [facility] [address] [place] [address] [phone] [facility] [phone] [address] [facility]  [address] [address]  Phone: [phone] Fax: [phone]

## 2022-11-28 RX ORDER — AMLODIPINE BESYLATE 5 MG/1
TABLET ORAL
Qty: 30 TABLET | Refills: 0 | Status: SHIPPED | OUTPATIENT
Start: 2022-11-28 | End: 2022-12-27

## 2022-11-28 RX ORDER — ATORVASTATIN CALCIUM 40 MG/1
TABLET, FILM COATED ORAL
Qty: 30 TABLET | Refills: 0 | Status: SHIPPED | OUTPATIENT
Start: 2022-11-28 | End: 2022-12-27

## 2022-11-28 NOTE — TELEPHONE ENCOUNTER
Medication:   Requested Prescriptions     Pending Prescriptions Disp Refills    amLODIPine (NORVASC) 5 MG tablet [Pharmacy Med Name: AMLODIPINE TAB 5MG] 90 tablet 0     Sig: TAKE 1 TABLET DAILY          Patient Phone Number: [phone] (home)     Last appt: 3/15/2022   Next appt: Visit date not found    Last OARRS: No flowsheet data found.   PDMP Monitoring:    Last PDMP Faviola Ling as Reviewed [facility]):  Review User Review Instant Review Result          Preferred Pharmacy:   [facility] [id number] Marlene Clemente, [address]  [address] [facility]  [address]  Phone: [phone] Fax: [phone]    [facility] [id number] [facility] [address] [phone] Anthony Reese [phone]  [address] [place] [address]  Phone: [phone] Fax: [phone]    [facility] [address] [place], [place] [address] [place] [address] [phone] Anthony Reese [phone] [address] [facility] [address]  Phone: [phone] Fax: [phone]

## 2022-12-27 RX ORDER — AMLODIPINE BESYLATE 5 MG/1
TABLET ORAL
Qty: 30 TABLET | Refills: 0 | Status: SHIPPED | OUTPATIENT
Start: 2022-12-27

## 2022-12-27 RX ORDER — ATORVASTATIN CALCIUM 40 MG/1
TABLET, FILM COATED ORAL
Qty: 30 TABLET | Refills: 0 | Status: SHIPPED | OUTPATIENT
Start: 2022-12-27

## 2022-12-27 NOTE — TELEPHONE ENCOUNTER
Medication:   Requested Prescriptions     Pending Prescriptions Disp Refills    amLODIPine (NORVASC) 5 MG tablet [Pharmacy Med Name: AMLODIPINE TAB 5MG] 30 tablet 0     Sig: TAKE 1 TABLET DAILY    atorvastatin (LIPITOR) 40 MG tablet [Pharmacy Med Name: ATORVASTATIN TAB 40MG] 30 tablet 0     Sig: TAKE 1 TABLET DAILY          Patient Phone Number: [phone] (home)     Last appt: 3/15/2022   Next appt: Visit date not found    Last OARRS: No flowsheet data found.   PDMP Monitoring:    Last PDMP Danny Reyez as Reviewed [facility]):  Review User Review Instant Review Result          Preferred Pharmacy:   Ruth Flick [id number] [facility], [address]  [address] [facility]  [address]  Phone: [phone] Fax: [phone]    [facility] Flick [id number] [place] [facility] [place] [address] [phone] Liz Almendarez [phone]  [address] [place] [address]  Phone: [phone] Fax: [phone]    [facility], [address] [place] [address] 35 [phone] Liz Almendarez [phone] [address]  [address] [address]  Phone: [phone] Fax: [phone]

## 2023-01-03 ENCOUNTER — HOSPITAL ENCOUNTER (OUTPATIENT)
Dept: MAMMOGRAPHY | Age: 58
Discharge: HOME | End: 2023-01-03
Payer: COMMERCIAL

## 2023-01-03 VITALS — WEIGHT: 160 LBS | HEIGHT: 66 IN | BODY MASS INDEX: 25.71 KG/M2

## 2023-01-03 DIAGNOSIS — Z12.31 ENCOUNTER FOR SCREENING MAMMOGRAM FOR BREAST CANCER: ICD-10-CM

## 2023-01-03 PROCEDURE — 77067 SCR MAMMO BI INCL CAD: CPT

## 2023-01-26 RX ORDER — AMLODIPINE BESYLATE 5 MG/1
TABLET ORAL
Qty: 30 TABLET | Refills: 0 | OUTPATIENT
Start: 2023-01-26

## 2023-01-26 RX ORDER — ATORVASTATIN CALCIUM 40 MG/1
TABLET, FILM COATED ORAL
Qty: 30 TABLET | Refills: 0 | OUTPATIENT
Start: 2023-01-26

## 2023-01-26 NOTE — TELEPHONE ENCOUNTER
Medication:   Requested Prescriptions     Pending Prescriptions Disp Refills    atorvastatin (LIPITOR) 40 MG tablet [Pharmacy Med Name: ATORVASTATIN TAB 40MG] 30 tablet 0     Sig: TAKE 1 TABLET DAILY    amLODIPine (NORVASC) 5 MG tablet [Pharmacy Med Name: AMLODIPINE TAB 5MG] 30 tablet 0     Sig: TAKE 1 TABLET DAILY          Patient Phone Number: [phone] (home)     Last appt: 3/15/2022   Next appt: Visit date not found    Last OARRS: No flowsheet data found.   PDMP Monitoring:    Last PDMP Marlee Han as Reviewed [facility]):  Review User Review Instant Review Result          Preferred Pharmacy:   [facility] [id number] Gabbie Quintana, [address]  [address] [facility]  [address] [facility] [address]  Phone: [phone] Fax: [phone]    [facility] [id number] [facility] [place] [address] [phone] Arvid Hockey [phone]  [address] [place] [address]  Phone: [phone] Fax: [phone]    [facility] [address], [phone] Arvid Hockey [phone] [address]  [address] [address]  Phone: [phone] Fax: [phone]

## 2023-02-24 RX ORDER — PANTOPRAZOLE SODIUM 40 MG/1
TABLET, DELAYED RELEASE ORAL
Qty: 90 TABLET | Refills: 0 | Status: SHIPPED | OUTPATIENT
Start: 2023-02-24 | End: 2023-03-07 | Stop reason: ALTCHOICE

## 2023-03-06 NOTE — PROGRESS NOTES
Immunization(s) given during visit:     Immunizations Administered       Name Date Dose Route    Tdap (Boostrix, Adacel) 3/7/2023 0.5 mL Intramuscular    Site: Deltoid- Left    Lot: [id number]    NDC: 90744-480-20             Patient instructed to remain in clinic for 20 minutes after injection and was advised to report any adverse reaction to me immediately.

## 2023-03-07 ENCOUNTER — OFFICE VISIT (OUTPATIENT)
Dept: FAMILY MEDICINE CLINIC | Age: 58
End: 2023-03-07

## 2023-03-07 VITALS
BODY MASS INDEX: 26.12 KG/M2 | OXYGEN SATURATION: 98 % | WEIGHT: 156.8 LBS | DIASTOLIC BLOOD PRESSURE: 80 MMHG | TEMPERATURE: 97 F | SYSTOLIC BLOOD PRESSURE: 138 MMHG | HEIGHT: 65 IN | HEART RATE: 84 BPM

## 2023-03-07 DIAGNOSIS — I10 ESSENTIAL HYPERTENSION: ICD-10-CM

## 2023-03-07 DIAGNOSIS — Z23 NEED FOR VACCINATION: ICD-10-CM

## 2023-03-07 DIAGNOSIS — E78.00 PURE HYPERCHOLESTEROLEMIA: ICD-10-CM

## 2023-03-07 DIAGNOSIS — Z00.00 WELL ADULT EXAM: Primary | ICD-10-CM

## 2023-03-07 RX ORDER — AMLODIPINE BESYLATE 5 MG/1
TABLET ORAL
Qty: 45 TABLET | Refills: 0 | Status: SHIPPED | OUTPATIENT
Start: 2023-03-07 | End: 2023-03-09 | Stop reason: SDUPTHER

## 2023-03-07 RX ORDER — ATORVASTATIN CALCIUM 40 MG/1
40 TABLET, FILM COATED ORAL DAILY
Qty: 30 TABLET | Refills: 0 | Status: SHIPPED | OUTPATIENT
Start: 2023-03-07

## 2023-03-07 RX ORDER — AMLODIPINE BESYLATE 5 MG/1
TABLET ORAL
Qty: 135 TABLET | Refills: 3 | Status: SHIPPED | OUTPATIENT
Start: 2023-03-07 | End: 2023-03-07 | Stop reason: SDUPTHER

## 2023-03-07 RX ORDER — ZINC GLUCONATE 50 MG
TABLET ORAL
COMMUNITY

## 2023-03-07 RX ORDER — ATORVASTATIN CALCIUM 40 MG/1
40 TABLET, FILM COATED ORAL DAILY
Qty: 90 TABLET | Refills: 3 | Status: SHIPPED | OUTPATIENT
Start: 2023-03-07 | End: 2023-03-07 | Stop reason: SDUPTHER

## 2023-03-07 RX ORDER — AMLODIPINE BESYLATE 5 MG/1
TABLET ORAL
Qty: 45 TABLET | Refills: 12 | Status: SHIPPED
Start: 2023-03-07 | End: 2023-03-07 | Stop reason: SDUPTHER

## 2023-03-07 SDOH — ECONOMIC STABILITY: FOOD INSECURITY: WITHIN THE PAST 12 MONTHS, YOU WORRIED THAT YOUR FOOD WOULD RUN OUT BEFORE YOU GOT MONEY TO BUY MORE.: NEVER TRUE

## 2023-03-07 SDOH — ECONOMIC STABILITY: FOOD INSECURITY: WITHIN THE PAST 12 MONTHS, THE FOOD YOU BOUGHT JUST DIDN'T LAST AND YOU DIDN'T HAVE MONEY TO GET MORE.: NEVER TRUE

## 2023-03-07 SDOH — ECONOMIC STABILITY: INCOME INSECURITY: HOW HARD IS IT FOR YOU TO PAY FOR THE VERY BASICS LIKE FOOD, HOUSING, MEDICAL CARE, AND HEATING?: NOT HARD AT ALL

## 2023-03-07 SDOH — ECONOMIC STABILITY: HOUSING INSECURITY
IN THE LAST 12 MONTHS, WAS THERE A TIME WHEN YOU DID NOT HAVE A STEADY PLACE TO SLEEP OR SLEPT IN A SHELTER (INCLUDING NOW)?: NO

## 2023-03-07 ASSESSMENT — PATIENT HEALTH QUESTIONNAIRE - PHQ9
2. FEELING DOWN, DEPRESSED OR HOPELESS: 0
SUM OF ALL RESPONSES TO PHQ QUESTIONS 1-9: 0
SUM OF ALL RESPONSES TO PHQ9 QUESTIONS 1 & 2: 0
SUM OF ALL RESPONSES TO PHQ QUESTIONS 1-9: 0
1. LITTLE INTEREST OR PLEASURE IN DOING THINGS: 0
SUM OF ALL RESPONSES TO PHQ QUESTIONS 1-9: 0
SUM OF ALL RESPONSES TO PHQ QUESTIONS 1-9: 0

## 2023-03-07 NOTE — PROGRESS NOTES
Here for annual physical and f/u HTN, chol. Dental:  Due; been 1 year and looking for new dentist   Eye: [facility]; close follow up due to increased risk of glaucoma     Colonoscopy: up-to-date    Pap: up-to-date; December; normal   Mammo: up-to-date      Exercise: 3 days per week at [employer]; walks on treadmill at an incline   Diet: trying mediterranean diet; low carb     Living Will: No,   Additional information provided        Checks BP at home: Yes  BP numbers: 130s/70s. Higher in diastolic during the 24   Taking medication daily: Yes (amlodipine) been taking 7.5 mg. Seeing [address] for kidney donation work up and results of 24 hour bp showed high diastolic therefore was put on 10 mg amlodipine daily. Patient had increased swelling in her ankles and cut the dose to 7.5 mg daily. Side Effects of medication: yes - swollen ankles  Reviewed notes and labs in Care Everywhere      Taking atorvastatin 40 mg daily. Tolerating well with no SE.            PHQ Scores 3/7/2023 5/25/2021 6/7/2018 4/5/2017   PHQ2 Score 0 0 0 0   PHQ9 Score 0 0 0 0       HM reviewed with pt    Patient's medications, allergies, past medical, surgical, social and family histories were reviewed and updated in the EHR as appropriate. Vitals:    03/07/23 1507 03/07/23 1549   BP: (!) 140/80 138/80   Site: Left Upper Arm    Position: Sitting    Cuff Size: Large Adult    Pulse: 84    Temp: 97 °F (36.1 °C)    TempSrc: Infrared    SpO2: 98%    Weight: 156 lb 12.8 oz (71.1 kg)    Height: 5' 4.5\" (1.638 m)      Wt Readings from Last 3 Encounters:   03/07/23 156 lb 12.8 oz (71.1 kg)   01/03/23 160 lb (72.6 kg)   03/15/22 148 lb (67.1 kg)     Body mass index is 26.5 kg/m². GENERAL Alert and oriented x 4 NAD, no acute distress, well hydrated, well developed.   NECK:supple and non tender without mass, no thyromegaly or thyroid nodules, no cervical lymphadenopathy  HEENT: TM clear bilaterally  LUNG:clear to auscultation bilaterally with normal respiratory effort  CV: Normal heart sounds, regular rate and rhythm without murmurs  EXTREMETY: no loss of hair, no edema, normal pedal pulses bilaterally  NEURO: CN grossly intact, moving all extremities equally, no gross deficits          ASSESSMENT AND PLAN:       Cornelia Alva was seen today for annual exam.    Diagnoses and all orders for this visit:    Well adult exam  Recommended screenings discussed and ordered if patient agreed. Patient up to date on breast, colon and cervical cancer screening. Recommended vaccinations discussed and ordered if patient agreed. Patient received Tdap and recommended to get Shingles vaccine at pharmacy. Encouraged healthy diet   Encouraged regular exercise and maintaining a healthy weight  Discussed living will/healthcare POA and encouraged to get if doesn't have. Essential hypertension  Blood pressures have been running in the upper 130's/ 70s. Discussed side effects of amlodipine including swelling in her ankles and how it has improved on the 7.5 mg dose. Due to workup for kidney donation will not start new medication such as hctz or acei and will allow [address] [place] [address],[address] to assess what bp control they require and medication restrictions for their kidney donation protocol.  -     amLODIPine (NORVASC) 5 MG tablet; Taking  1 & 1/2 tablets    Pure hypercholesterolemia  The current medical regimen is effective;  continue present plan and medications. -     atorvastatin (LIPITOR) 40 MG tablet; Take 1 tablet by mouth daily    Need for vaccination  -     Tdap, BOOSTRIX, (age 8 yrs+), IM            Return in about 1 year (around 3/7/2024) for 30 min, Well.      Madeline Padilla MS3    Portions of Note per  Madeline Padilla MS3 with corrections and edits per Margarita Arora MD.  I agree with entirety of note and was present and performed history and physical.  I also confirm that the note above accurately reflects all work, treatment, procedures, and medical decision making performed by Mustapha wright MD

## 2023-03-07 NOTE — PATIENT INSTRUCTIONS
--Check with pharmacy about getting the shingles vaccine, Shingrix (not Zostavax)      --Make appointment to see the dentist

## 2023-03-09 RX ORDER — AMLODIPINE BESYLATE 5 MG/1
TABLET ORAL
Qty: 135 TABLET | Refills: 0 | Status: SHIPPED | OUTPATIENT
Start: 2023-03-09

## 2023-03-09 NOTE — TELEPHONE ENCOUNTER
Received fax from [facility] that they didn't like the directions on Amlodipine script. Please resend.

## 2023-03-28 RX ORDER — AMLODIPINE BESYLATE 5 MG/1
TABLET ORAL
Qty: 135 TABLET | Refills: 3 | Status: SHIPPED | OUTPATIENT
Start: 2023-03-28

## 2023-03-28 RX ORDER — ATORVASTATIN CALCIUM 40 MG/1
40 TABLET, FILM COATED ORAL DAILY
Qty: 30 TABLET | Refills: 3 | Status: SHIPPED | OUTPATIENT
Start: 2023-03-28

## 2023-05-02 ENCOUNTER — TELEPHONE (OUTPATIENT)
Dept: FAMILY MEDICINE CLINIC | Age: 58
End: 2023-05-02

## 2023-05-02 RX ORDER — AMLODIPINE BESYLATE 2.5 MG/1
2.5 TABLET ORAL DAILY
Qty: 90 TABLET | Refills: 3 | Status: SHIPPED | OUTPATIENT
Start: 2023-05-02

## 2023-05-02 NOTE — TELEPHONE ENCOUNTER
amLODIPine (NORVASC) 5 MG tablet         [facility] [id number] - Fanny Aravind, [address]  [facility]      Patient called about her medication being changed and her needing to cut the pill In half to take the correct dosage and she said the pills she received [facility] cannot be cut in half they just crumble, I recommended a pill cutter and she said it did not work well. She is requesting another refill be sent to the pharmacy above so she can get the pills that do break.    Please Advise

## 2023-05-25 RX ORDER — PANTOPRAZOLE SODIUM 40 MG/1
TABLET, DELAYED RELEASE ORAL
Qty: 90 TABLET | Refills: 0 | OUTPATIENT
Start: 2023-05-25

## 2023-08-21 ENCOUNTER — OFFICE VISIT (OUTPATIENT)
Dept: FAMILY MEDICINE CLINIC | Age: 58
End: 2023-08-21
Payer: COMMERCIAL

## 2023-08-21 VITALS
BODY MASS INDEX: 26.84 KG/M2 | DIASTOLIC BLOOD PRESSURE: 78 MMHG | TEMPERATURE: 97.3 F | WEIGHT: 158.8 LBS | SYSTOLIC BLOOD PRESSURE: 130 MMHG

## 2023-08-21 DIAGNOSIS — L30.9 DERMATITIS: Primary | ICD-10-CM

## 2023-08-21 PROCEDURE — 3075F SYST BP GE 130 - 139MM HG: CPT | Performed by: FAMILY MEDICINE

## 2023-08-21 PROCEDURE — 3078F DIAST BP <80 MM HG: CPT | Performed by: FAMILY MEDICINE

## 2023-08-21 PROCEDURE — 99213 OFFICE O/P EST LOW 20 MIN: CPT | Performed by: FAMILY MEDICINE

## 2023-08-21 RX ORDER — PREDNISONE 20 MG/1
40 TABLET ORAL DAILY
Qty: 10 TABLET | Refills: 0 | Status: SHIPPED | OUTPATIENT
Start: 2023-08-21 | End: 2023-08-26

## 2023-08-21 ASSESSMENT — PATIENT HEALTH QUESTIONNAIRE - PHQ9
SUM OF ALL RESPONSES TO PHQ QUESTIONS 1-9: 0
2. FEELING DOWN, DEPRESSED OR HOPELESS: 0
SUM OF ALL RESPONSES TO PHQ QUESTIONS 1-9: 0
SUM OF ALL RESPONSES TO PHQ QUESTIONS 1-9: 0
1. LITTLE INTEREST OR PLEASURE IN DOING THINGS: 0
SUM OF ALL RESPONSES TO PHQ QUESTIONS 1-9: 0
SUM OF ALL RESPONSES TO PHQ9 QUESTIONS 1 & 2: 0

## 2023-10-24 NOTE — PROGRESS NOTES
Care Plan goals met at 0833am.
Name:  Ledy Ly  Age:  53 y.o.  CSN:  [id number]            Past Medical History:        Diagnosis Date    Anxiety     Depression     Essential hypertension 11/10/2017    Hematuria     negative workup - [place]    Kidney stones     [facility] of [facility] 2017    Nephrolithiasis 2017    Palpitations     Pure hypercholesterolemia     Urethral stricture        Past Surgical History:      Procedure Laterality Date    CYSTOURETHROSCOPY/URETHRAL DILATION      multiple    KIDNEY STONE SURGERY      PARATHYROIDECTOMY  9262    UMBILICAL HERNIA REPAIR         Medications Prior to Admission:  Medications Prior to Admission: amLODIPine (NORVASC) 5 MG tablet, Take 1 tablet by mouth daily  atorvastatin (LIPITOR) 40 MG tablet, Take 1 tablet by mouth daily  lansoprazole (PREVACID) 30 MG delayed release capsule, Take 1 capsule by mouth daily    Allergies:  Sulfa antibiotics    Social History:  Social History     Socioeconomic History    Marital status:      Spouse name: Not on file    Number of children: Not on file    Years of education: Not on file    Highest education level: Not on file   Occupational History    Not on file   Social Needs    Financial resource strain: Not on file    Food insecurity:     Worry: Not on file     Inability: Not on file    Transportation needs:     Medical: Not on file     Non-medical: Not on file   Tobacco Use    Smoking status: Former Smoker     Last attempt to quit: 10/5/2017     Years since quittin.6    Smokeless tobacco: Never Used   Substance and Sexual Activity    Alcohol use: Yes     Comment: rare    Drug use: No    Sexual activity: Yes     Partners: Male   Lifestyle    Physical activity:     Days per week: Not on file     Minutes per session: Not on file    Stress: Not on file   Relationships    Social connections:     Talks on phone: Not on file     Gets together: Not on file     Attends [employer]
Teaching / education initiated regarding perioperative experience, expectations, and pain management during stay. Patient verbalized understanding.
1.82

## 2024-01-08 ENCOUNTER — HOSPITAL ENCOUNTER (OUTPATIENT)
Dept: MAMMOGRAPHY | Age: 59
Discharge: HOME | End: 2024-01-08
Payer: COMMERCIAL

## 2024-01-08 DIAGNOSIS — Z12.31 ENCOUNTER FOR SCREENING MAMMOGRAM FOR BREAST CANCER: ICD-10-CM

## 2024-01-08 PROCEDURE — 77063 BREAST TOMOSYNTHESIS BI: CPT

## 2024-02-01 RX ORDER — AMLODIPINE BESYLATE 5 MG/1
TABLET ORAL
Qty: 135 TABLET | Refills: 1 | Status: SHIPPED | OUTPATIENT
Start: 2024-02-01

## 2024-02-01 RX ORDER — ATORVASTATIN CALCIUM 40 MG/1
40 TABLET, FILM COATED ORAL DAILY
Qty: 30 TABLET | Refills: 1 | Status: SHIPPED | OUTPATIENT
Start: 2024-02-01

## 2024-03-06 NOTE — PROGRESS NOTES
Renee Hare is here today to follow up from hospital  Was admitted in   [facility]  on 2/7/24 -        Donated Kidney to [facility].  Said she feels great.  Came home not on BP meds but BP was high so restarted them a few weeks ago. BP now running 120's/80's.       Has a knot or mass on her right finger she would like looked at.      Reviewed chart notes, labs and imaging in Care Everywhere and/or Epic.      Body mass index is 27.31 kg/m².  Vitals:    03/08/24 1138   BP: 120/80   Site: Left Upper Arm   Position: Sitting   Cuff Size: Medium Adult   Pulse: 89   Temp: 97.6 °F (36.4 °C)   TempSrc: Infrared   SpO2: 98%   Weight: 73.3 kg (161 lb 9.6 oz)   Height: 1.638 m (5' 4.5\")     Wt Readings from Last 3 Encounters:   03/08/24 73.3 kg (161 lb 9.6 oz)   08/21/23 72 kg (158 lb 12.8 oz)   03/07/23 71.1 kg (156 lb 12.8 oz)     Today's PHQ:        3/8/2024    11:38 AM 8/21/2023    11:31 AM 3/7/2023     3:01 PM 5/25/2021     2:36 PM 6/7/2018    10:34 AM 4/5/2017     6:14 PM   PHQ Scores   PHQ2 Score 0 0 0 0 0 0   PHQ9 Score 0 0 0 0 0 0     Interpretation of Total Score Depression Severity: 1-4 = Minimal depression, 5-9 = Mild depression, 10-14 = Moderate depression, 15-19 = Moderately severe depression, 20-27 = Severe depression         GENERAL:Alert and oriented x 4 NAD, affect appropriate and overweight, well hydrated, well developed.  LUNG:clear to auscultation bilaterally with normal respiratory effort  CV: Normal heart sounds, regular rate and rhythm without murmurs  EXTREMETY: no loss of hair, no edema, normal pedal pulses bilaterally  Abd incision well healed with no signs of infection          ASSESSMENT AND PLAN:       Renee was seen today for follow-up from hospital.    Diagnoses and all orders for this visit:    Essential hypertension  -     Lipid Panel; Future  -     CBC; Future  -Stable, continue current medications.  Pt states surgeons at [facility] advised her not to check kidney labs for several months. States

## 2024-03-08 ENCOUNTER — OFFICE VISIT (OUTPATIENT)
Dept: FAMILY MEDICINE CLINIC | Age: 59
End: 2024-03-08
Payer: COMMERCIAL

## 2024-03-08 VITALS
HEART RATE: 89 BPM | OXYGEN SATURATION: 98 % | HEIGHT: 65 IN | SYSTOLIC BLOOD PRESSURE: 120 MMHG | TEMPERATURE: 97.6 F | BODY MASS INDEX: 26.92 KG/M2 | DIASTOLIC BLOOD PRESSURE: 80 MMHG | WEIGHT: 161.6 LBS

## 2024-03-08 DIAGNOSIS — I10 ESSENTIAL HYPERTENSION: Primary | ICD-10-CM

## 2024-03-08 DIAGNOSIS — Z52.4 KIDNEY DONOR: ICD-10-CM

## 2024-03-08 DIAGNOSIS — Z83.49 FAMILY HISTORY OF HEMOCHROMATOSIS: ICD-10-CM

## 2024-03-08 PROCEDURE — 3079F DIAST BP 80-89 MM HG: CPT | Performed by: FAMILY MEDICINE

## 2024-03-08 PROCEDURE — 3074F SYST BP LT 130 MM HG: CPT | Performed by: FAMILY MEDICINE

## 2024-03-08 PROCEDURE — 99213 OFFICE O/P EST LOW 20 MIN: CPT | Performed by: FAMILY MEDICINE

## 2024-03-08 RX ORDER — AMLODIPINE BESYLATE 5 MG/1
TABLET ORAL
Qty: 160 TABLET | Refills: 3 | Status: SHIPPED | OUTPATIENT
Start: 2024-03-08

## 2024-03-08 RX ORDER — ATORVASTATIN CALCIUM 40 MG/1
40 TABLET, FILM COATED ORAL DAILY
Qty: 90 TABLET | Refills: 3 | Status: SHIPPED | OUTPATIENT
Start: 2024-03-08

## 2024-03-08 SDOH — ECONOMIC STABILITY: FOOD INSECURITY: WITHIN THE PAST 12 MONTHS, THE FOOD YOU BOUGHT JUST DIDN'T LAST AND YOU DIDN'T HAVE MONEY TO GET MORE.: NEVER TRUE

## 2024-03-08 SDOH — ECONOMIC STABILITY: INCOME INSECURITY: HOW HARD IS IT FOR YOU TO PAY FOR THE VERY BASICS LIKE FOOD, HOUSING, MEDICAL CARE, AND HEATING?: NOT HARD AT ALL

## 2024-03-08 SDOH — ECONOMIC STABILITY: FOOD INSECURITY: WITHIN THE PAST 12 MONTHS, YOU WORRIED THAT YOUR FOOD WOULD RUN OUT BEFORE YOU GOT MONEY TO BUY MORE.: NEVER TRUE

## 2024-03-08 ASSESSMENT — PATIENT HEALTH QUESTIONNAIRE - PHQ9
SUM OF ALL RESPONSES TO PHQ QUESTIONS 1-9: 0
SUM OF ALL RESPONSES TO PHQ QUESTIONS 1-9: 0
2. FEELING DOWN, DEPRESSED OR HOPELESS: 0
SUM OF ALL RESPONSES TO PHQ9 QUESTIONS 1 & 2: 0
SUM OF ALL RESPONSES TO PHQ QUESTIONS 1-9: 0
SUM OF ALL RESPONSES TO PHQ QUESTIONS 1-9: 0
1. LITTLE INTEREST OR PLEASURE IN DOING THINGS: 0

## 2024-04-29 ENCOUNTER — HOSPITAL ENCOUNTER (OUTPATIENT)
Dept: GENERAL RADIOLOGY | Age: 59
Discharge: HOME | End: 2024-04-29
Payer: COMMERCIAL

## 2024-04-29 ENCOUNTER — OFFICE VISIT (OUTPATIENT)
Dept: FAMILY MEDICINE CLINIC | Age: 59
End: 2024-04-29
Payer: COMMERCIAL

## 2024-04-29 ENCOUNTER — HOSPITAL ENCOUNTER (OUTPATIENT)
Age: 59
Discharge: HOME | End: 2024-04-29
Payer: COMMERCIAL

## 2024-04-29 VITALS
OXYGEN SATURATION: 99 % | TEMPERATURE: 96.8 F | HEART RATE: 80 BPM | BODY MASS INDEX: 28.32 KG/M2 | WEIGHT: 167.6 LBS | DIASTOLIC BLOOD PRESSURE: 80 MMHG | SYSTOLIC BLOOD PRESSURE: 128 MMHG

## 2024-04-29 DIAGNOSIS — R06.09 DOE (DYSPNEA ON EXERTION): Primary | ICD-10-CM

## 2024-04-29 DIAGNOSIS — R60.0 PERIPHERAL EDEMA: ICD-10-CM

## 2024-04-29 DIAGNOSIS — R53.83 OTHER FATIGUE: ICD-10-CM

## 2024-04-29 DIAGNOSIS — R06.09 DOE (DYSPNEA ON EXERTION): ICD-10-CM

## 2024-04-29 DIAGNOSIS — Z52.4 KIDNEY DONOR: ICD-10-CM

## 2024-04-29 DIAGNOSIS — I10 ESSENTIAL HYPERTENSION: ICD-10-CM

## 2024-04-29 DIAGNOSIS — K59.00 CONSTIPATION, UNSPECIFIED CONSTIPATION TYPE: ICD-10-CM

## 2024-04-29 LAB
ALBUMIN SERPL-MCNC: 4.6 G/DL (ref 3.4–5)
ALBUMIN/GLOB SERPL: 1.8 {RATIO} (ref 1.1–2.2)
ALP SERPL-CCNC: 93 U/L (ref 40–129)
ALT SERPL-CCNC: 15 U/L (ref 10–40)
ANION GAP SERPL CALCULATED.3IONS-SCNC: 14 MMOL/L (ref 3–16)
AST SERPL-CCNC: 19 U/L (ref 15–37)
BILIRUB SERPL-MCNC: 0.3 MG/DL (ref 0–1)
BUN SERPL-MCNC: 18 MG/DL (ref 7–20)
CALCIUM SERPL-MCNC: 9.5 MG/DL (ref 8.3–10.6)
CHLORIDE SERPL-SCNC: 103 MMOL/L (ref 99–110)
CO2 SERPL-SCNC: 22 MMOL/L (ref 21–32)
CREAT SERPL-MCNC: 1.1 MG/DL (ref 0.6–1.2)
DEPRECATED RDW RBC AUTO: 13.4 % (ref 12.4–15.4)
GFR SERPLBLD CREATININE-BSD FMLA CKD-EPI: 57 ML/MIN/{1.73_M2}
GLUCOSE SERPL-MCNC: 82 MG/DL (ref 70–99)
HCT VFR BLD AUTO: 36.4 % (ref 36–48)
HGB BLD-MCNC: 12.3 G/DL (ref 12–16)
MAGNESIUM SERPL-MCNC: 2.1 MG/DL (ref 1.8–2.4)
MCH RBC QN AUTO: 30.4 PG (ref 26–34)
MCHC RBC AUTO-ENTMCNC: 33.9 G/DL (ref 31–36)
MCV RBC AUTO: 89.6 FL (ref 80–100)
NT-PROBNP SERPL-MCNC: 282 PG/ML (ref 0–124)
PLATELET # BLD AUTO: 187 K/UL (ref 135–450)
PMV BLD AUTO: 9 FL (ref 5–10.5)
POTASSIUM SERPL-SCNC: 4 MMOL/L (ref 3.5–5.1)
PROT SERPL-MCNC: 7.1 G/DL (ref 6.4–8.2)
RBC # BLD AUTO: 4.06 M/UL (ref 4–5.2)
SODIUM SERPL-SCNC: 139 MMOL/L (ref 136–145)
TSH SERPL DL<=0.005 MIU/L-ACNC: 1.66 UIU/ML (ref 0.27–4.2)
WBC # BLD AUTO: 6.3 K/UL (ref 4–11)

## 2024-04-29 PROCEDURE — 71046 X-RAY EXAM CHEST 2 VIEWS: CPT

## 2024-04-29 PROCEDURE — 83880 ASSAY OF NATRIURETIC PEPTIDE: CPT

## 2024-04-29 PROCEDURE — 3074F SYST BP LT 130 MM HG: CPT | Performed by: FAMILY MEDICINE

## 2024-04-29 PROCEDURE — 36415 COLL VENOUS BLD VENIPUNCTURE: CPT

## 2024-04-29 PROCEDURE — 3079F DIAST BP 80-89 MM HG: CPT | Performed by: FAMILY MEDICINE

## 2024-04-29 PROCEDURE — 80053 COMPREHEN METABOLIC PANEL: CPT

## 2024-04-29 PROCEDURE — 85027 COMPLETE CBC AUTOMATED: CPT

## 2024-04-29 PROCEDURE — 84443 ASSAY THYROID STIM HORMONE: CPT

## 2024-04-29 PROCEDURE — 99214 OFFICE O/P EST MOD 30 MIN: CPT | Performed by: FAMILY MEDICINE

## 2024-04-29 PROCEDURE — 83735 ASSAY OF MAGNESIUM: CPT

## 2024-04-29 RX ORDER — CARVEDILOL 6.25 MG/1
6.25 TABLET ORAL 2 TIMES DAILY
Qty: 60 TABLET | Refills: 3 | Status: CANCELLED | OUTPATIENT
Start: 2024-04-29

## 2024-04-29 NOTE — PROGRESS NOTES
Patient is here today for some swelling.  States that she has had some recent kidney surgery (kidneydonation 2/7).  Has been having some ankle and abdominal swelling.  Along with this has been constipation.  Going on for 2 weeks with symptoms.  Has been trying to adjust her BP medication to coincide with her swelling.  States has been decreasing her amlodipine to 5mg daily starting today but has been slowly decreasing over past few weeks. Also noticing when sitting will get feeling like heartbeat irregular. States when lying left side would \"feel my heartbeat\" and would notice but no other sx. When was at [facility] told she had skipped beats but reviewing notes from admission no mention made and normal EKG.    States since surgery feels like always having to take a deep breath, feels more sob when doing things.  notes she is breathing \"funny.\"  Back to work, notes can't do what used to, just doesn't feel good. 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 No chest pain. Feeling more bloated in stomach. Having issues with BM, gets constipated, will go several days in a row where will hardly go, barely get little hard stools. Will take a laxative but those make her feel like heart racing.       Having some right low back pain but has been active at work and home.       Body mass index is 28.32 kg/m².    Vitals:    04/29/24 1102   BP: 128/80   Site: Left Upper Arm   Position: Sitting   Cuff Size: Large Adult   Pulse: 80   Temp: 96.8 °F (36 °C)   TempSrc: Infrared   SpO2: 99%   Weight: 76 kg (167 lb 9.6 oz)     Wt Readings from Last 3 Encounters:   04/29/24 76 kg (167 lb 9.6 oz)   03/08/24 73.3 kg (161 lb 9.6 oz)   08/21/23 72 kg (158 lb 12.8 oz)         3/8/2024    11:38 AM 8/21/2023    11:31 AM 3/7/2023     3:01 PM 5/25/2021     2:36 PM 6/7/2018    10:34 AM 4/5/2017     6:14 PM   PHQ Scores   PHQ2 Score 0 0 0 0 0 0   PHQ9 Score 0 0 0 0 0 0       Interpretation of Total Score Depression Severity: 1-4 = Minimal

## 2024-04-30 ENCOUNTER — TELEPHONE (OUTPATIENT)
Dept: FAMILY MEDICINE CLINIC | Age: 59
End: 2024-04-30

## 2024-04-30 DIAGNOSIS — I50.9 CONGESTIVE HEART FAILURE, UNSPECIFIED HF CHRONICITY, UNSPECIFIED HEART FAILURE TYPE (HCC): Primary | ICD-10-CM

## 2024-04-30 RX ORDER — FUROSEMIDE 20 MG/1
20 TABLET ORAL DAILY
Qty: 3 TABLET | Refills: 0 | Status: SHIPPED | OUTPATIENT
Start: 2024-04-30

## 2024-04-30 NOTE — TELEPHONE ENCOUNTER
----- Message from Raquel Casillas MD sent at 4/30/2024  6:48 AM EDT -----  Labs overall look ok, kidneys improved from post transplant. One test is showing possible mild heart failure, recommend lasix 20mg daily x 3 days.   -needs to weigh herself every AM and write down readings and bring to visit.   -get echo dx: CHF  -appt to see me in 2 weeks - ok same days

## 2024-05-13 ENCOUNTER — TELEPHONE (OUTPATIENT)
Dept: FAMILY MEDICINE CLINIC | Age: 59
End: 2024-05-13

## 2024-05-13 NOTE — TELEPHONE ENCOUNTER
Pt has echo tomorrow and is wondering if she can get blood work instead. Would like to discuss further with a nurse before tomorrow.     Please advise...

## 2024-05-14 ENCOUNTER — HOSPITAL ENCOUNTER (OUTPATIENT)
Age: 59
Discharge: HOME | End: 2024-05-16
Attending: FAMILY MEDICINE
Payer: COMMERCIAL

## 2024-05-14 VITALS
HEIGHT: 65 IN | WEIGHT: 167 LBS | SYSTOLIC BLOOD PRESSURE: 128 MMHG | BODY MASS INDEX: 27.82 KG/M2 | DIASTOLIC BLOOD PRESSURE: 80 MMHG

## 2024-05-14 DIAGNOSIS — I50.9 CONGESTIVE HEART FAILURE, UNSPECIFIED HF CHRONICITY, UNSPECIFIED HEART FAILURE TYPE (HCC): ICD-10-CM

## 2024-05-14 LAB
ECHO AO ASC DIAM: 3.3 CM
ECHO AO ASCENDING AORTA INDEX: 1.8 CM/M2
ECHO AO ROOT DIAM: 2.9 CM
ECHO AO ROOT INDEX: 1.58 CM/M2
ECHO AV AREA PEAK VELOCITY: 2.3 CM2
ECHO AV AREA VTI: 2.1 CM2
ECHO AV AREA/BSA PEAK VELOCITY: 1.3 CM2/M2
ECHO AV AREA/BSA VTI: 1.1 CM2/M2
ECHO AV MEAN GRADIENT: 5 MMHG
ECHO AV MEAN VELOCITY: 1 M/S
ECHO AV PEAK GRADIENT: 8 MMHG
ECHO AV PEAK VELOCITY: 1.4 M/S
ECHO AV VELOCITY RATIO: 0.86
ECHO AV VTI: 31 CM
ECHO BSA: 1.86 M2
ECHO LA AREA 2C: 19.9 CM2
ECHO LA AREA 4C: 12.5 CM2
ECHO LA DIAMETER INDEX: 1.91 CM/M2
ECHO LA DIAMETER: 3.5 CM
ECHO LA MAJOR AXIS: 4.5 CM
ECHO LA MINOR AXIS: 5 CM
ECHO LA TO AORTIC ROOT RATIO: 1.21
ECHO LA VOL BP: 44 ML (ref 22–52)
ECHO LA VOL MOD A2C: 62 ML (ref 22–52)
ECHO LA VOL MOD A4C: 28 ML (ref 22–52)
ECHO LA VOL/BSA BIPLANE: 24 ML/M2 (ref 16–34)
ECHO LA VOLUME INDEX MOD A2C: 34 ML/M2 (ref 16–34)
ECHO LA VOLUME INDEX MOD A4C: 15 ML/M2 (ref 16–34)
ECHO LV E' LATERAL VELOCITY: 6 CM/S
ECHO LV E' SEPTAL VELOCITY: 7 CM/S
ECHO LV EDV A2C: 94 ML
ECHO LV EDV A4C: 85 ML
ECHO LV EDV INDEX A4C: 46 ML/M2
ECHO LV EDV NDEX A2C: 51 ML/M2
ECHO LV EJECTION FRACTION A2C: 67 %
ECHO LV EJECTION FRACTION A4C: 66 %
ECHO LV EJECTION FRACTION BIPLANE: 66 % (ref 55–100)
ECHO LV ESV A2C: 32 ML
ECHO LV ESV A4C: 29 ML
ECHO LV ESV INDEX A2C: 17 ML/M2
ECHO LV ESV INDEX A4C: 16 ML/M2
ECHO LV FRACTIONAL SHORTENING: 47 % (ref 28–44)
ECHO LV INTERNAL DIMENSION DIASTOLE INDEX: 2.35 CM/M2
ECHO LV INTERNAL DIMENSION DIASTOLIC: 4.3 CM (ref 3.9–5.3)
ECHO LV INTERNAL DIMENSION SYSTOLIC INDEX: 1.26 CM/M2
ECHO LV INTERNAL DIMENSION SYSTOLIC: 2.3 CM
ECHO LV IVSD: 0.9 CM (ref 0.6–0.9)
ECHO LV MASS 2D: 123.3 G (ref 67–162)
ECHO LV MASS INDEX 2D: 67.4 G/M2 (ref 43–95)
ECHO LV POSTERIOR WALL DIASTOLIC: 0.9 CM (ref 0.6–0.9)
ECHO LV RELATIVE WALL THICKNESS RATIO: 0.42
ECHO LVOT AREA: 2.8 CM2
ECHO LVOT AV VTI INDEX: 0.73
ECHO LVOT DIAM: 1.9 CM
ECHO LVOT MEAN GRADIENT: 3 MMHG
ECHO LVOT PEAK GRADIENT: 5 MMHG
ECHO LVOT PEAK VELOCITY: 1.2 M/S
ECHO LVOT STROKE VOLUME INDEX: 34.8 ML/M2
ECHO LVOT SV: 63.8 ML
ECHO LVOT VTI: 22.5 CM
ECHO MV A VELOCITY: 0.69 M/S
ECHO MV AREA VTI: 3 CM2
ECHO MV E DECELERATION TIME (DT): 190 MS
ECHO MV E VELOCITY: 0.76 M/S
ECHO MV E/A RATIO: 1.1
ECHO MV E/E' LATERAL: 12.67
ECHO MV E/E' RATIO (AVERAGED): 11.76
ECHO MV E/E' SEPTAL: 10.86
ECHO MV LVOT VTI INDEX: 0.96
ECHO MV MAX VELOCITY: 0.8 M/S
ECHO MV MEAN GRADIENT: 1 MMHG
ECHO MV MEAN VELOCITY: 0.5 M/S
ECHO MV PEAK GRADIENT: 3 MMHG
ECHO MV VTI: 21.6 CM
ECHO PV ACCELERATION TIME (AT): 148 MS
ECHO PV MAX VELOCITY: 0.5 M/S
ECHO PV PEAK GRADIENT: 1 MMHG
ECHO RA AREA 4C: 15.7 CM2
ECHO RA END SYSTOLIC VOLUME APICAL 4 CHAMBER INDEX BSA: 21 ML/M2
ECHO RA VOLUME: 39 ML
ECHO RV BASAL DIMENSION: 3.6 CM
ECHO RV FREE WALL PEAK S': 12 CM/S
ECHO RV MID DIMENSION: 1.9 CM
ECHO RV TAPSE: 2.7 CM (ref 1.7–?)
ECHO TV REGURGITANT MAX VELOCITY: 2.08 M/S
ECHO TV REGURGITANT PEAK GRADIENT: 17 MMHG

## 2024-05-14 PROCEDURE — 93306 TTE W/DOPPLER COMPLETE: CPT

## 2024-05-14 PROCEDURE — 93306 TTE W/DOPPLER COMPLETE: CPT | Performed by: INTERNAL MEDICINE

## 2024-05-17 ENCOUNTER — OFFICE VISIT (OUTPATIENT)
Dept: FAMILY MEDICINE CLINIC | Age: 59
End: 2024-05-17
Payer: COMMERCIAL

## 2024-05-17 VITALS
TEMPERATURE: 98 F | HEART RATE: 87 BPM | WEIGHT: 165 LBS | SYSTOLIC BLOOD PRESSURE: 134 MMHG | BODY MASS INDEX: 27.46 KG/M2 | DIASTOLIC BLOOD PRESSURE: 80 MMHG | OXYGEN SATURATION: 97 %

## 2024-05-17 DIAGNOSIS — R60.9 EDEMA, UNSPECIFIED TYPE: ICD-10-CM

## 2024-05-17 DIAGNOSIS — I10 ESSENTIAL HYPERTENSION: Primary | ICD-10-CM

## 2024-05-17 DIAGNOSIS — K59.00 CONSTIPATION, UNSPECIFIED CONSTIPATION TYPE: ICD-10-CM

## 2024-05-17 PROCEDURE — 99214 OFFICE O/P EST MOD 30 MIN: CPT | Performed by: FAMILY MEDICINE

## 2024-05-17 PROCEDURE — 3075F SYST BP GE 130 - 139MM HG: CPT | Performed by: FAMILY MEDICINE

## 2024-05-17 PROCEDURE — 3079F DIAST BP 80-89 MM HG: CPT | Performed by: FAMILY MEDICINE

## 2024-05-17 RX ORDER — VALSARTAN 160 MG/1
160 TABLET ORAL DAILY
Qty: 30 TABLET | Refills: 1 | Status: SHIPPED | OUTPATIENT
Start: 2024-05-17

## 2024-05-17 NOTE — PATIENT INSTRUCTIONS
Monitor blood pressure 2-3 times a week and drop off readings for review in 1 month.    Goal BP is <140/80 for many people, good control is <130/80    Need a new cuff? Check this website to make sure your BP cuff has been validated for accuracy:    www.validatebp.org

## 2024-05-17 NOTE — PROGRESS NOTES
Chief Complaint   Patient presents with    Discuss Labs     Follow up on echo and lab results. Still having swelling on bilateral leg. SOB improve after taking lasix        Took lasix x 3 days and swelling went away pretty quickly. Went on trip to [place] and was ok, got home Saturday and started noticing swelling again until Wednesday. Yesterday was pretty bad.     Having a loose cough in AM, not really getting anything up. Nothing through day.    Also more constipated since surgery, using miralax and helps. Feels bloated in abdomen.       Body mass index is 27.46 kg/m².    Vitals:    05/17/24 1147   BP: 134/80   Pulse: 87   Temp: 98 °F (36.7 °C)   SpO2: 97%   Weight: 74.8 kg (165 lb)     Wt Readings from Last 3 Encounters:   05/17/24 74.8 kg (165 lb)   05/14/24 75.8 kg (167 lb)   04/29/24 76 kg (167 lb 9.6 oz)         3/8/2024    11:38 AM 8/21/2023    11:31 AM 3/7/2023     3:01 PM 5/25/2021     2:36 PM 6/7/2018    10:34 AM 4/5/2017     6:14 PM   PHQ Scores   PHQ2 Score 0 0 0 0 0 0   PHQ9 Score 0 0 0 0 0 0       Interpretation of Total Score Depression Severity: 1-4 = Minimal depression, 5-9 = Mild depression, 10-14 = Moderate depression, 15-19 = Moderately severe depression, 20-27 = Severe depression       GENERAL:Alert and oriented x 4 NAD, affect appropriate and overweight, well hydrated, well developed.  LUNG:clear to auscultation bilaterally with normal respiratory effort  CV: Normal heart sounds, regular rate and rhythm without murmurs  EXTREMETY: no loss of hair, no edema, normal pedal pulses bilaterally        ASSESSMENT AND PLAN:       Renee was seen today for discuss labs.    Diagnoses and all orders for this visit:    Essential hypertension  -     Basic Metabolic Panel; Future  Stop the amlodipine due to swelling  Start valsartan   Monitor BP and drop off readings in a month    Edema, unspecified type  Improved but gets off and on, likely due to meds  Stop as above  Reviewed echo with pt and  Full range of motion of upper and lower extremities, no joint tenderness/swelling.

## 2024-06-22 ENCOUNTER — PATIENT MESSAGE (OUTPATIENT)
Dept: FAMILY MEDICINE CLINIC | Age: 59
End: 2024-06-22

## 2024-06-24 NOTE — TELEPHONE ENCOUNTER
From: Renee Hare  To: Dr. Raquel Casillas  Sent: 6/22/2024 6:37 PM EDT  Subject: Blood pressure     Hi,  I’ve been tracking my blood pressure on the new medication and it is much better than my previous prescription. It continues to read 110-120 over 70-75. I haven’t had the swelling in my ankles.     I will try to have blood work sometime this week.       Thanks,  Becka

## 2024-06-26 DIAGNOSIS — Z52.4 KIDNEY DONOR: ICD-10-CM

## 2024-06-26 DIAGNOSIS — Z83.49 FAMILY HISTORY OF HEMOCHROMATOSIS: ICD-10-CM

## 2024-06-26 DIAGNOSIS — I10 ESSENTIAL HYPERTENSION: ICD-10-CM

## 2024-06-26 LAB
ANION GAP SERPL CALCULATED.3IONS-SCNC: 18 MMOL/L (ref 3–16)
BUN SERPL-MCNC: 27 MG/DL (ref 7–20)
CALCIUM SERPL-MCNC: 9.5 MG/DL (ref 8.3–10.6)
CHLORIDE SERPL-SCNC: 101 MMOL/L (ref 99–110)
CHOLEST SERPL-MCNC: 226 MG/DL (ref 0–199)
CO2 SERPL-SCNC: 22 MMOL/L (ref 21–32)
CREAT SERPL-MCNC: 1.2 MG/DL (ref 0.6–1.2)
DEPRECATED RDW RBC AUTO: 13 % (ref 12.4–15.4)
FERRITIN SERPL IA-MCNC: 312 NG/ML (ref 15–150)
GFR SERPLBLD CREATININE-BSD FMLA CKD-EPI: 52 ML/MIN/{1.73_M2}
GLUCOSE SERPL-MCNC: 76 MG/DL (ref 70–99)
HCT VFR BLD AUTO: 39.3 % (ref 36–48)
HDLC SERPL-MCNC: 48 MG/DL (ref 40–60)
HGB BLD-MCNC: 13.3 G/DL (ref 12–16)
LDLC SERPL CALC-MCNC: 152 MG/DL
MCH RBC QN AUTO: 30 PG (ref 26–34)
MCHC RBC AUTO-ENTMCNC: 33.8 G/DL (ref 31–36)
MCV RBC AUTO: 88.7 FL (ref 80–100)
PLATELET # BLD AUTO: 197 K/UL (ref 135–450)
PMV BLD AUTO: 8.5 FL (ref 5–10.5)
POTASSIUM SERPL-SCNC: 4.1 MMOL/L (ref 3.5–5.1)
RBC # BLD AUTO: 4.43 M/UL (ref 4–5.2)
SODIUM SERPL-SCNC: 141 MMOL/L (ref 136–145)
TRIGL SERPL-MCNC: 130 MG/DL (ref 0–150)
VLDLC SERPL CALC-MCNC: 26 MG/DL
WBC # BLD AUTO: 4.7 K/UL (ref 4–11)

## 2024-06-27 RX ORDER — VALSARTAN 160 MG/1
160 TABLET ORAL DAILY
Qty: 90 TABLET | Refills: 3 | Status: SHIPPED | OUTPATIENT
Start: 2024-06-27

## 2024-06-27 RX ORDER — ATORVASTATIN CALCIUM 40 MG/1
40 TABLET, FILM COATED ORAL DAILY
Qty: 90 TABLET | Refills: 3 | Status: SHIPPED | OUTPATIENT
Start: 2024-06-27

## 2024-07-02 LAB
HFE GENE MUT ANL BLD/T: NORMAL
HFE P.C282Y BLD/T QL: NEGATIVE
HFE P.H63D BLD/T QL: NEGATIVE
HFE P.S65C BLD/T QL: NEGATIVE
SPECIMEN SOURCE: NORMAL

## 2024-07-03 ENCOUNTER — TELEPHONE (OUTPATIENT)
Dept: FAMILY MEDICINE CLINIC | Age: 59
End: 2024-07-03

## 2024-07-03 DIAGNOSIS — E78.00 PURE HYPERCHOLESTEROLEMIA: ICD-10-CM

## 2024-07-03 DIAGNOSIS — I10 ESSENTIAL HYPERTENSION: ICD-10-CM

## 2024-07-03 DIAGNOSIS — R53.83 OTHER FATIGUE: Primary | ICD-10-CM

## 2024-07-03 RX ORDER — ROSUVASTATIN CALCIUM 20 MG/1
20 TABLET, COATED ORAL NIGHTLY
Qty: 90 TABLET | Refills: 0 | Status: SHIPPED | OUTPATIENT
Start: 2024-07-03

## 2024-07-03 NOTE — TELEPHONE ENCOUNTER
----- Message from Raquel Casillas MD sent at 7/3/2024 10:55 AM EDT -----  Then would change as chol not well controlled  Crestor 20mg daily

## 2024-07-15 RX ORDER — VALSARTAN 160 MG/1
160 TABLET ORAL DAILY
Qty: 30 TABLET | Refills: 2 | Status: SHIPPED | OUTPATIENT
Start: 2024-07-15

## 2024-07-15 NOTE — TELEPHONE ENCOUNTER
Medication:   Requested Prescriptions     Pending Prescriptions Disp Refills    valsartan (DIOVAN) 160 MG tablet [Pharmacy Med Name: VALSARTAN 160 MG TABLET] 30 tablet      Sig: TAKE 1 TABLET BY MOUTH DAILY      Different pharmacy than where last Rx was sent.    Patient Phone Number: [phone] (home)     Last appt: 5/17/2024   Next appt: 8/19/2024    Last OARRS:        No data to display              PDMP Monitoring:    Last PDMP [place] as Reviewed ([place]):  Review User Review Instant Review Result          Preferred Pharmacy:   [facility] - [place], [place] - [address] [facility] [facility] - P [phone] - F [phone]  [address]  [facility] [address]  Phone: [phone] Fax: [phone]    [facility] [id number] - [place], [place] - [address] - P [phone] - F [phone]  [address] [facility] [address]  Phone: [phone] Fax: [phone]    [facility] [id number] - [place], [place] - [address] - P [phone] - F [phone]  [address] [address]  Phone: [phone] Fax: [phone]    [facility] - [place], [place] - [address] - P [phone] - F [phone]  [address] [facility] [address]  Phone: [phone] Fax: [phone]    [facility] [id number] - [place], [place] - [address]  - P [phone] - F [phone]  [address] [facility] [address]  Phone: [phone] Fax: [phone]

## 2024-09-13 DIAGNOSIS — E78.00 PURE HYPERCHOLESTEROLEMIA: ICD-10-CM

## 2024-09-13 RX ORDER — ROSUVASTATIN CALCIUM 20 MG/1
20 TABLET, COATED ORAL NIGHTLY
Qty: 90 TABLET | Refills: 0 | Status: SHIPPED | OUTPATIENT
Start: 2024-09-13

## 2024-09-23 ENCOUNTER — OFFICE VISIT (OUTPATIENT)
Dept: FAMILY MEDICINE CLINIC | Age: 59
End: 2024-09-23
Payer: COMMERCIAL

## 2024-09-23 VITALS
TEMPERATURE: 97.5 F | HEART RATE: 71 BPM | BODY MASS INDEX: 27.62 KG/M2 | WEIGHT: 161.8 LBS | DIASTOLIC BLOOD PRESSURE: 80 MMHG | HEIGHT: 64 IN | OXYGEN SATURATION: 98 % | SYSTOLIC BLOOD PRESSURE: 130 MMHG

## 2024-09-23 DIAGNOSIS — I10 ESSENTIAL HYPERTENSION: Primary | ICD-10-CM

## 2024-09-23 DIAGNOSIS — N18.31 STAGE 3A CHRONIC KIDNEY DISEASE (HCC): ICD-10-CM

## 2024-09-23 DIAGNOSIS — E78.00 PURE HYPERCHOLESTEROLEMIA: ICD-10-CM

## 2024-09-23 PROCEDURE — 99214 OFFICE O/P EST MOD 30 MIN: CPT | Performed by: FAMILY MEDICINE

## 2024-09-23 PROCEDURE — 3079F DIAST BP 80-89 MM HG: CPT | Performed by: FAMILY MEDICINE

## 2024-09-23 PROCEDURE — 3075F SYST BP GE 130 - 139MM HG: CPT | Performed by: FAMILY MEDICINE

## 2024-09-23 RX ORDER — VALSARTAN 160 MG/1
160 TABLET ORAL DAILY
Qty: 90 TABLET | Refills: 3 | Status: SHIPPED | OUTPATIENT
Start: 2024-09-23

## 2024-09-23 RX ORDER — ROSUVASTATIN CALCIUM 20 MG/1
20 TABLET, COATED ORAL NIGHTLY
Qty: 90 TABLET | Refills: 3 | Status: SHIPPED | OUTPATIENT
Start: 2024-09-23

## 2024-09-23 ASSESSMENT — PATIENT HEALTH QUESTIONNAIRE - PHQ9
SUM OF ALL RESPONSES TO PHQ9 QUESTIONS 1 & 2: 0
SUM OF ALL RESPONSES TO PHQ QUESTIONS 1-9: 0
1. LITTLE INTEREST OR PLEASURE IN DOING THINGS: NOT AT ALL
SUM OF ALL RESPONSES TO PHQ QUESTIONS 1-9: 0
SUM OF ALL RESPONSES TO PHQ QUESTIONS 1-9: 0
2. FEELING DOWN, DEPRESSED OR HOPELESS: NOT AT ALL
SUM OF ALL RESPONSES TO PHQ QUESTIONS 1-9: 0

## 2025-02-26 ENCOUNTER — TELEPHONE (OUTPATIENT)
Dept: FAMILY MEDICINE CLINIC | Age: 60
End: 2025-02-26

## 2025-02-26 ENCOUNTER — HOSPITAL ENCOUNTER (OUTPATIENT)
Dept: MAMMOGRAPHY | Age: 60
Discharge: HOME | End: 2025-02-26
Payer: COMMERCIAL

## 2025-02-26 DIAGNOSIS — Z12.31 ENCOUNTER FOR SCREENING MAMMOGRAM FOR BREAST CANCER: ICD-10-CM

## 2025-02-26 DIAGNOSIS — R92.8 ABNORMAL MAMMOGRAM: Primary | ICD-10-CM

## 2025-02-26 PROCEDURE — 77063 BREAST TOMOSYNTHESIS BI: CPT

## 2025-02-26 NOTE — TELEPHONE ENCOUNTER
----- Message from Dr. Raquel Casillas MD sent at 2/26/2025  4:53 PM EST -----  Needs right breast dx mammo with u/s

## 2025-02-27 ENCOUNTER — PATIENT MESSAGE (OUTPATIENT)
Dept: FAMILY MEDICINE CLINIC | Age: 60
End: 2025-02-27

## 2025-02-27 ENCOUNTER — TELEPHONE (OUTPATIENT)
Dept: WOMENS IMAGING | Age: 60
End: 2025-02-27

## 2025-02-27 DIAGNOSIS — R92.8 ABNORMAL MAMMOGRAM: Primary | ICD-10-CM

## 2025-03-06 ENCOUNTER — HOSPITAL ENCOUNTER (OUTPATIENT)
Dept: ULTRASOUND IMAGING | Age: 60
Discharge: HOME | End: 2025-03-06
Attending: FAMILY MEDICINE
Payer: COMMERCIAL

## 2025-03-06 ENCOUNTER — HOSPITAL ENCOUNTER (OUTPATIENT)
Dept: WOMENS IMAGING | Age: 60
Discharge: HOME | End: 2025-03-06
Attending: FAMILY MEDICINE
Payer: COMMERCIAL

## 2025-03-06 DIAGNOSIS — R92.8 ABNORMAL MAMMOGRAM: ICD-10-CM

## 2025-03-06 PROCEDURE — G0279 TOMOSYNTHESIS, MAMMO: HCPCS

## 2025-03-24 ASSESSMENT — PATIENT HEALTH QUESTIONNAIRE - PHQ9
2. FEELING DOWN, DEPRESSED OR HOPELESS: NOT AT ALL
SUM OF ALL RESPONSES TO PHQ QUESTIONS 1-9: 0
SUM OF ALL RESPONSES TO PHQ9 QUESTIONS 1 & 2: 0
1. LITTLE INTEREST OR PLEASURE IN DOING THINGS: NOT AT ALL
2. FEELING DOWN, DEPRESSED OR HOPELESS: NOT AT ALL
SUM OF ALL RESPONSES TO PHQ QUESTIONS 1-9: 0
1. LITTLE INTEREST OR PLEASURE IN DOING THINGS: NOT AT ALL

## 2025-03-26 ENCOUNTER — TELEPHONE (OUTPATIENT)
Dept: FAMILY MEDICINE CLINIC | Age: 60
End: 2025-03-26

## 2025-03-26 ENCOUNTER — OFFICE VISIT (OUTPATIENT)
Dept: FAMILY MEDICINE CLINIC | Age: 60
End: 2025-03-26

## 2025-03-26 VITALS
TEMPERATURE: 97.2 F | HEART RATE: 95 BPM | HEIGHT: 65 IN | BODY MASS INDEX: 28.02 KG/M2 | DIASTOLIC BLOOD PRESSURE: 74 MMHG | WEIGHT: 168.2 LBS | SYSTOLIC BLOOD PRESSURE: 120 MMHG | OXYGEN SATURATION: 99 %

## 2025-03-26 DIAGNOSIS — R06.83 SNORING: ICD-10-CM

## 2025-03-26 DIAGNOSIS — I10 ESSENTIAL HYPERTENSION: ICD-10-CM

## 2025-03-26 DIAGNOSIS — Z52.4 KIDNEY DONOR: ICD-10-CM

## 2025-03-26 DIAGNOSIS — E78.00 PURE HYPERCHOLESTEROLEMIA: ICD-10-CM

## 2025-03-26 DIAGNOSIS — Z00.00 WELL ADULT EXAM: Primary | ICD-10-CM

## 2025-03-26 DIAGNOSIS — Z23 NEED FOR VACCINATION: ICD-10-CM

## 2025-03-26 DIAGNOSIS — N18.31 STAGE 3A CHRONIC KIDNEY DISEASE (HCC): ICD-10-CM

## 2025-03-26 SDOH — ECONOMIC STABILITY: FOOD INSECURITY: WITHIN THE PAST 12 MONTHS, YOU WORRIED THAT YOUR FOOD WOULD RUN OUT BEFORE YOU GOT MONEY TO BUY MORE.: NEVER TRUE

## 2025-03-26 SDOH — ECONOMIC STABILITY: FOOD INSECURITY: WITHIN THE PAST 12 MONTHS, THE FOOD YOU BOUGHT JUST DIDN'T LAST AND YOU DIDN'T HAVE MONEY TO GET MORE.: NEVER TRUE

## 2025-03-26 NOTE — PATIENT INSTRUCTIONS
--Bring in copy of living will and healthcare power of  for your chart.      --Check with pharmacy about getting the shingles vaccine, Shingrix (not Zostavax)      --Get your Covid vaccine this fall. If you recently had Covid you can wait 3-4 months before getting it.      --Make sure you get your flu shot this fall. If you are over 65 look for the \"high dose\" flu shot for better protection.

## 2025-03-26 NOTE — PROGRESS NOTES
Immunization(s) given during visit:     Immunizations Administered       Name Date Dose Route    Hep B, HEPLISAV-B, (age 18y+), IM, 0.5mL 3/26/2025 0.5 mL Intramuscular    Site: Deltoid- Left    Lot: [id number]    NDC: 28515-522-58    Pneumococcal, PCV20, PREVNAR 20, (age 6w+), IM, 0.5mL 3/26/2025 0.5 mL Intramuscular    Site: Deltoid- Right    Lot: [id number]    NDC: 4594-1972-97             Patient instructed to remain in clinic for 20 minutes after injection and was advised to report any adverse reaction to me immediately.       
respiratory effort  CV: Normal heart sounds, regular rate and rhythm without murmurs  EXTREMETY: no loss of hair, no edema, normal pedal pulses bilaterally  NEURO: CN grossly intact, moving all extremities equally, no gross deficits          ASSESSMENT AND PLAN:       Renee \"Becka\" was seen today for annual exam.    Diagnoses and all orders for this visit:    Well adult exam  Recommended screenings discussed and ordered if patient agreed  Recommended vaccinations discussed and ordered if patient agreed  Encouraged healthy diet   Encouraged regular exercise and maintaining a healthy weight  Discussed living will/healthcare POA and encouraged to get if doesn't have.   \"To Do List\" given to patient in AVS    Essential hypertension  -     Lipid Panel; Future  -     Comprehensive Metabolic Panel; Future  -     CBC; Future  -Stable, continue current medications.  (Labs ordered contributed to MDM)    Pure hypercholesterolemia  -     Lipid Panel; Future  -     Comprehensive Metabolic Panel; Future  -     CBC; Future  -Stable, continue current medications.    Snoring  -     Rey Kahn MD, Sleep Medicine ([facility]), [facility]    Stage 3a chronic kidney disease (HCC)  -     GEORGINA - Vin Fernández MD, Nephrology, [facility]  Reviewed labs from [facility] in January  Kidney fxn has not improved a year out from donation  Recommend follow up with nephrology given only one kidney now   Discussed importance of risk factor modification including controlling BP and diabetes as well as avoiding nephrotoxic medications including OTC medications such as NSAIDS.    Kidney donor  -     Vin Curran MD, Nephrology, [facility]    Need for vaccination  -     Pneumococcal, PCV20, PREVNAR 20, (age 6w+), IM, PF            Return in about 1 year (around 3/26/2026) for Well, 30 min.         Portions of Note per  Sara Núñez CMA AAMA with corrections and edits per Raquel Casillas MD.  I agree with

## 2025-04-01 ENCOUNTER — TELEPHONE (OUTPATIENT)
Age: 60
End: 2025-04-01

## 2025-04-01 NOTE — TELEPHONE ENCOUNTER
Pt cancelled npt appt snoring, no previous studies, no machine with KYUNG Wilburn for 5/1/25. Pt arrived at office on 4/1/25 for appt, which was not scheduled until 5/1/25. Provider had already left this office for training elsewhere.  Pt stated she wanted to cancel appt due to missing so much work. Pt did not want to reschedule at this time.

## 2025-04-04 NOTE — TELEPHONE ENCOUNTER
Patient called back about the VV, didn't know when you wanted to put her in. She said to call her back.

## 2025-04-04 NOTE — TELEPHONE ENCOUNTER
Patient called with message left for patient to call back to office offering to r/s as a VV consult.

## 2025-04-14 ENCOUNTER — HOSPITAL ENCOUNTER (OUTPATIENT)
Age: 60
Discharge: HOME | End: 2025-04-14
Payer: COMMERCIAL

## 2025-04-14 ENCOUNTER — RESULTS FOLLOW-UP (OUTPATIENT)
Dept: FAMILY MEDICINE CLINIC | Age: 60
End: 2025-04-14

## 2025-04-14 DIAGNOSIS — E78.00 PURE HYPERCHOLESTEROLEMIA: ICD-10-CM

## 2025-04-14 DIAGNOSIS — I10 ESSENTIAL HYPERTENSION: ICD-10-CM

## 2025-04-14 LAB
ALBUMIN SERPL-MCNC: 4.2 G/DL (ref 3.4–5)
ALBUMIN/GLOB SERPL: 1.6 {RATIO} (ref 1.1–2.2)
ALP SERPL-CCNC: 73 U/L (ref 40–129)
ALT SERPL-CCNC: 21 U/L (ref 10–40)
ANION GAP SERPL CALCULATED.3IONS-SCNC: 9 MMOL/L (ref 3–16)
AST SERPL-CCNC: 21 U/L (ref 15–37)
BILIRUB SERPL-MCNC: <0.2 MG/DL (ref 0–1)
BUN SERPL-MCNC: 17 MG/DL (ref 7–20)
CALCIUM SERPL-MCNC: 9.3 MG/DL (ref 8.3–10.6)
CHLORIDE SERPL-SCNC: 104 MMOL/L (ref 99–110)
CHOLEST SERPL-MCNC: 154 MG/DL (ref 0–199)
CO2 SERPL-SCNC: 25 MMOL/L (ref 21–32)
CREAT SERPL-MCNC: 1.2 MG/DL (ref 0.6–1.2)
DEPRECATED RDW RBC AUTO: 14 % (ref 12.4–15.4)
GFR SERPLBLD CREATININE-BSD FMLA CKD-EPI: 51 ML/MIN/{1.73_M2}
GLUCOSE SERPL-MCNC: 96 MG/DL (ref 70–99)
HCT VFR BLD AUTO: 36.5 % (ref 36–48)
HDLC SERPL-MCNC: 39 MG/DL (ref 40–60)
HGB BLD-MCNC: 12.2 G/DL (ref 12–16)
LDLC SERPL CALC-MCNC: 95 MG/DL
MCH RBC QN AUTO: 30.1 PG (ref 26–34)
MCHC RBC AUTO-ENTMCNC: 33.5 G/DL (ref 31–36)
MCV RBC AUTO: 89.8 FL (ref 80–100)
PLATELET # BLD AUTO: 169 K/UL (ref 135–450)
PMV BLD AUTO: 8.3 FL (ref 5–10.5)
POTASSIUM SERPL-SCNC: 4.3 MMOL/L (ref 3.5–5.1)
PROT SERPL-MCNC: 6.9 G/DL (ref 6.4–8.2)
RBC # BLD AUTO: 4.06 M/UL (ref 4–5.2)
SODIUM SERPL-SCNC: 138 MMOL/L (ref 136–145)
TRIGL SERPL-MCNC: 99 MG/DL (ref 0–150)
VLDLC SERPL CALC-MCNC: 20 MG/DL
WBC # BLD AUTO: 4.3 K/UL (ref 4–11)

## 2025-04-14 PROCEDURE — 80053 COMPREHEN METABOLIC PANEL: CPT

## 2025-04-14 PROCEDURE — 36415 COLL VENOUS BLD VENIPUNCTURE: CPT

## 2025-04-14 PROCEDURE — 80061 LIPID PANEL: CPT

## 2025-04-14 PROCEDURE — 85027 COMPLETE CBC AUTOMATED: CPT

## 2025-05-06 ENCOUNTER — TELEMEDICINE (OUTPATIENT)
Age: 60
End: 2025-05-06
Payer: COMMERCIAL

## 2025-05-06 DIAGNOSIS — R06.83 SNORING: Primary | ICD-10-CM

## 2025-05-06 DIAGNOSIS — G47.30 SLEEP APNEA, UNSPECIFIED TYPE: ICD-10-CM

## 2025-05-06 DIAGNOSIS — G47.00 INSOMNIA, UNSPECIFIED TYPE: ICD-10-CM

## 2025-05-06 PROCEDURE — 99204 OFFICE O/P NEW MOD 45 MIN: CPT

## 2025-05-06 ASSESSMENT — SLEEP AND FATIGUE QUESTIONNAIRES
HOW LIKELY ARE YOU TO NOD OFF OR FALL ASLEEP WHILE SITTING AND READING: SLIGHT CHANCE OF DOZING
HOW LIKELY ARE YOU TO NOD OFF OR FALL ASLEEP WHILE SITTING INACTIVE IN A PUBLIC PLACE: SLIGHT CHANCE OF DOZING
HOW LIKELY ARE YOU TO NOD OFF OR FALL ASLEEP WHILE SITTING AND READING: SLIGHT CHANCE OF DOZING
HOW LIKELY ARE YOU TO NOD OFF OR FALL ASLEEP WHEN YOU ARE A PASSENGER IN A CAR FOR AN HOUR WITHOUT A BREAK: SLIGHT CHANCE OF DOZING
ESS TOTAL SCORE: 7
HOW LIKELY ARE YOU TO NOD OFF OR FALL ASLEEP WHEN YOU ARE A PASSENGER IN A CAR FOR AN HOUR WITHOUT A BREAK: SLIGHT CHANCE OF DOZING
HOW LIKELY ARE YOU TO NOD OFF OR FALL ASLEEP WHILE LYING DOWN TO REST IN THE AFTERNOON WHEN CIRCUMSTANCES PERMIT: MODERATE CHANCE OF DOZING
HOW LIKELY ARE YOU TO NOD OFF OR FALL ASLEEP WHILE SITTING QUIETLY AFTER LUNCH WITHOUT ALCOHOL: SLIGHT CHANCE OF DOZING
HOW LIKELY ARE YOU TO NOD OFF OR FALL ASLEEP IN A CAR, WHILE STOPPED FOR A FEW MINUTES IN TRAFFIC: WOULD NEVER DOZE
HOW LIKELY ARE YOU TO NOD OFF OR FALL ASLEEP WHILE SITTING AND TALKING TO SOMEONE: WOULD NEVER DOZE
HOW LIKELY ARE YOU TO NOD OFF OR FALL ASLEEP WHILE LYING DOWN TO REST IN THE AFTERNOON WHEN CIRCUMSTANCES PERMIT: MODERATE CHANCE OF DOZING
HOW LIKELY ARE YOU TO NOD OFF OR FALL ASLEEP WHILE WATCHING TV: SLIGHT CHANCE OF DOZING
HOW LIKELY ARE YOU TO NOD OFF OR FALL ASLEEP WHILE SITTING QUIETLY AFTER LUNCH WITHOUT ALCOHOL: SLIGHT CHANCE OF DOZING
HOW LIKELY ARE YOU TO NOD OFF OR FALL ASLEEP WHILE SITTING AND TALKING TO SOMEONE: WOULD NEVER DOZE
HOW LIKELY ARE YOU TO NOD OFF OR FALL ASLEEP IN A CAR, WHILE STOPPED FOR A FEW MINUTES IN TRAFFIC: WOULD NEVER DOZE
HOW LIKELY ARE YOU TO NOD OFF OR FALL ASLEEP WHILE WATCHING TV: SLIGHT CHANCE OF DOZING
HOW LIKELY ARE YOU TO NOD OFF OR FALL ASLEEP WHILE SITTING INACTIVE IN A PUBLIC PLACE: SLIGHT CHANCE OF DOZING

## 2025-05-06 NOTE — PATIENT INSTRUCTIONS
What's next:  Schedule a home sleep study with the sleep lab (call them at [phone] if you do not receive their call.)    Read through the sleep hygiene tips below to see what kind of changes you can start working on now  We will meet after your sleep study to discuss results, options and recommendations from there     It was great to meet you and take care Becka!  -Marry     ______________________________________________________________________  Never drive a car or operate a motorized vehicle while drowsy or sleepy.   ______________________________________________________________________       Sleep Center/Lab Phone #: [phone]                 [facility] location:  [address] [place] [address], [address], [place] [address]  [facility]:  [address] [facility], [address], [place] [address]    For in-lab testing: please bring with you:  Appropriate nightclothes (pajamas, sweats, etc.), slippers and robe  All medications you will need during you stay, including any breathing medications, nebulizers and metered dose inhalers  Your own toiletries and hairdryer if you wish to shower before you leave  Current photo I.D. and Insurance card  Please note that:   Arrival time for your sleep study is approximately 8:30 pm and most patients have completed their study by 6 am the following morning.    We do not allow any pillows or bed linens from home due to health regulations  We recommend that you not bring valuables with you to the sleep center, as we cannot be responsible for any lost or damaged personal items  There is no smoking allowed anywhere on [facility] at any time  Each patient room is a private room with a private bathroom  The test itself is painless and not invasive in any way; it consists of electrodes and sensors attached to specific areas of your scalp, face, chest and legs.  We will also monitor respiratory effort, nasal and oral airflow and oxygen levels.  The test will not hurt- it is

## 2025-05-06 NOTE — PROGRESS NOTES
Renee Hare was evaluated through a synchronous (real-time) audio-video encounter. The patient (or guardian if applicable) is aware that this is a billable service, which includes applicable co-pays. This Virtual Visit was conducted with patient's (and/or legal guardian's) consent. Patient identification was verified, and a caregiver was present when appropriate. The patient was located at home [place], [place], in a state where the provider was licensed to provide care. Provider was located at facility: [address] [place] [address], [address], [place] [address].   --Marry Abdalla PA-C on 5/8/2025 at 10:22 PM  An electronic signature was used to authenticate this note.     SLEEP MEDICINE CONSULT NOTE  CC: Snoring  Referring Provider: Patient is being seen at the request of Dr. Raquel Casillas for a consultation to evaluate for Obstructive Sleep Apnea.    Presenting HPI 5/6/25:    History of Present Illness  59 yo female presents for sleep apnea evaluation after she recently went on a family trip and her son informed her of very loud snoring.  This is new.  Pt is also not satisfied with sleep quality and wakes up every hour all through the night for unknown reasons.  She has not been feeling well during the day and feels medication regimen may need changed for HTN & HLD management.  Getting very tired in evenings and looking to go to bed around 7-7:30p, usually gets to sleep quickly, average 7-8 hrs sleep total.  Up to restroom \"all night.\"  Gets more sleep on weekends.  Wakes up tired typically.  Sometimes feels good but sometimes feels tired even with a full night of good sleep.  Characterizes tiredness as more fatigued & run down than sleepiness.  Tries to avoid daytime naps but sometimes falls asleep in recliner unintentionally for a few mins.  Her  and mom are both on CPAP with good benefit.  Coffee helps with energy. ESS is: 7.        Past Medical History:   Diagnosis Date    Anxiety     Arthritis

## 2025-06-02 ENCOUNTER — HOSPITAL ENCOUNTER (OUTPATIENT)
Dept: SLEEP CENTER | Age: 60
Discharge: HOME | End: 2025-06-04
Payer: COMMERCIAL

## 2025-06-02 DIAGNOSIS — R06.83 SNORING: ICD-10-CM

## 2025-06-02 DIAGNOSIS — G47.30 SLEEP APNEA, UNSPECIFIED TYPE: ICD-10-CM

## 2025-06-02 PROCEDURE — 95806 SLEEP STUDY UNATT&RESP EFFT: CPT

## 2025-06-03 ENCOUNTER — RESULTS FOLLOW-UP (OUTPATIENT)
Dept: PULMONOLOGY | Age: 60
End: 2025-06-03

## 2025-06-03 PROBLEM — R06.83 SNORING: Status: ACTIVE | Noted: 2025-06-03

## 2025-06-03 PROBLEM — G47.30 SLEEP APNEA: Status: ACTIVE | Noted: 2025-06-03

## 2025-06-09 ASSESSMENT — SLEEP AND FATIGUE QUESTIONNAIRES
HOW LIKELY ARE YOU TO NOD OFF OR FALL ASLEEP WHILE SITTING QUIETLY AFTER LUNCH WITHOUT ALCOHOL: SLIGHT CHANCE OF DOZING
ESS TOTAL SCORE: 8
HOW LIKELY ARE YOU TO NOD OFF OR FALL ASLEEP WHILE SITTING AND READING: SLIGHT CHANCE OF DOZING
HOW LIKELY ARE YOU TO NOD OFF OR FALL ASLEEP WHILE SITTING AND TALKING TO SOMEONE: SLIGHT CHANCE OF DOZING
HOW LIKELY ARE YOU TO NOD OFF OR FALL ASLEEP WHILE SITTING INACTIVE IN A PUBLIC PLACE: SLIGHT CHANCE OF DOZING
HOW LIKELY ARE YOU TO NOD OFF OR FALL ASLEEP WHILE LYING DOWN TO REST IN THE AFTERNOON WHEN CIRCUMSTANCES PERMIT: SLIGHT CHANCE OF DOZING
HOW LIKELY ARE YOU TO NOD OFF OR FALL ASLEEP WHEN YOU ARE A PASSENGER IN A CAR FOR AN HOUR WITHOUT A BREAK: SLIGHT CHANCE OF DOZING
HOW LIKELY ARE YOU TO NOD OFF OR FALL ASLEEP WHEN YOU ARE A PASSENGER IN A CAR FOR AN HOUR WITHOUT A BREAK: SLIGHT CHANCE OF DOZING
HOW LIKELY ARE YOU TO NOD OFF OR FALL ASLEEP WHILE WATCHING TV: MODERATE CHANCE OF DOZING
HOW LIKELY ARE YOU TO NOD OFF OR FALL ASLEEP IN A CAR, WHILE STOPPED FOR A FEW MINUTES IN TRAFFIC: WOULD NEVER DOZE
HOW LIKELY ARE YOU TO NOD OFF OR FALL ASLEEP WHILE WATCHING TV: MODERATE CHANCE OF DOZING
HOW LIKELY ARE YOU TO NOD OFF OR FALL ASLEEP WHILE SITTING AND READING: SLIGHT CHANCE OF DOZING
HOW LIKELY ARE YOU TO NOD OFF OR FALL ASLEEP WHILE SITTING INACTIVE IN A PUBLIC PLACE: SLIGHT CHANCE OF DOZING
HOW LIKELY ARE YOU TO NOD OFF OR FALL ASLEEP WHILE SITTING AND TALKING TO SOMEONE: SLIGHT CHANCE OF DOZING
HOW LIKELY ARE YOU TO NOD OFF OR FALL ASLEEP WHILE LYING DOWN TO REST IN THE AFTERNOON WHEN CIRCUMSTANCES PERMIT: SLIGHT CHANCE OF DOZING
HOW LIKELY ARE YOU TO NOD OFF OR FALL ASLEEP IN A CAR, WHILE STOPPED FOR A FEW MINUTES IN TRAFFIC: WOULD NEVER DOZE
HOW LIKELY ARE YOU TO NOD OFF OR FALL ASLEEP WHILE SITTING QUIETLY AFTER LUNCH WITHOUT ALCOHOL: SLIGHT CHANCE OF DOZING

## 2025-06-10 ENCOUNTER — OFFICE VISIT (OUTPATIENT)
Age: 60
End: 2025-06-10
Payer: COMMERCIAL

## 2025-06-10 VITALS
OXYGEN SATURATION: 97 % | HEART RATE: 82 BPM | HEIGHT: 65 IN | BODY MASS INDEX: 27.52 KG/M2 | WEIGHT: 165.2 LBS | DIASTOLIC BLOOD PRESSURE: 76 MMHG | SYSTOLIC BLOOD PRESSURE: 122 MMHG

## 2025-06-10 DIAGNOSIS — G47.33 OSA (OBSTRUCTIVE SLEEP APNEA): Primary | ICD-10-CM

## 2025-06-10 DIAGNOSIS — R35.1 NOCTURIA: ICD-10-CM

## 2025-06-10 DIAGNOSIS — G47.00 INSOMNIA, UNSPECIFIED TYPE: ICD-10-CM

## 2025-06-10 PROCEDURE — 3074F SYST BP LT 130 MM HG: CPT

## 2025-06-10 PROCEDURE — 99214 OFFICE O/P EST MOD 30 MIN: CPT

## 2025-06-10 PROCEDURE — 3078F DIAST BP <80 MM HG: CPT

## 2025-06-10 NOTE — PATIENT INSTRUCTIONS
[phone] [address] [place], [place] [address]   [facility]  Oxygen/[phone] [address] [place] [address]  **[place]**   [facility]  Oxygen/[phone]  [phone] [address] [place] [address]   [facility]  Oxygen/[phone] [address] [place] [address]   [facility]  ([facility])  CPAP only [phone] [address] [place] [address]  **Near [facility] [phone]    1-800 CPAP (store) [phone] [place]   [facility]  [phone]    [facility] [phone]     [facility] [phone]    [facility] [phone]  [phone]    [facility] ([facility]) [phone]

## 2025-06-10 NOTE — PROGRESS NOTES
SLEEP MEDICINE PROGRESS NOTE  CC: Snoring  Referring Provider: Dr. Raquel Casillas  to evaluate for Obstructive Sleep Apnea.    Interval History 6/10/25:  CMT f/u HST  -  Had HST 6/2/25 demonstrating moderate [facility] with AHI >18.  ESS is: 8.   Pt understands results, is not surprised by dx and is receptive to PAP trial after discussing options. Her spouse wears CPAP so she is pretty familiar with equipment.  Strong preference for nasal mask; he wears cup style. Found several styles that felt very tolerable today.      Presenting HPI 5/6/25:  59 yo female presents for sleep apnea evaluation after she recently went on a family trip and her son informed her of very loud snoring.  This is new.  Pt is also not satisfied with sleep quality and wakes up every hour all through the night for unknown reasons.  She has not been feeling well during the day and feels medication regimen may need changed for HTN & HLD management.  Getting very tired in evenings and looking to go to bed around 7-7:30p, usually gets to sleep quickly, average 7-8 hrs sleep total.  Up to restroom \"all night.\"  Gets more sleep on weekends.  Wakes up tired typically.  Sometimes feels good but sometimes feels tired even with a full night of good sleep.  Characterizes tiredness as more fatigued & run down than sleepiness.  Tries to avoid daytime naps but sometimes falls asleep in recliner unintentionally for a few mins.  Her  and mom are both on CPAP with good benefit.  Coffee helps with energy. ESS is: 7.       PHYSICAL EXAM:  Blood pressure 122/76, pulse 82, height 1.638 m (5' 4.5\"), weight 74.9 kg (165 lb 3.2 oz), SpO2 97%.'    Apr 2025;   Constitutional:  No acute distress.   HENT:  Oropharynx is clear and moist.   Neck: No tracheal deviation present.   Cardiovascular: Normal heart sounds.  No lower extremity edema.  Pulmonary/Chest: No wheezes. No rhonchi. No rales. No decreased breath sounds.  No accessory muscle usage or stridor.

## 2025-06-11 ENCOUNTER — TELEPHONE (OUTPATIENT)
Dept: PULMONOLOGY | Age: 60
End: 2025-06-11

## 2025-06-11 NOTE — TELEPHONE ENCOUNTER
PAP orders faxed, with testing/OV note/demographics/insurance, to [facility] via [employer] at [phone].  Notified PSS.

## 2025-09-04 ENCOUNTER — OFFICE VISIT (OUTPATIENT)
Age: 60
End: 2025-09-04
Payer: COMMERCIAL

## 2025-09-04 VITALS
WEIGHT: 169 LBS | OXYGEN SATURATION: 98 % | HEART RATE: 97 BPM | DIASTOLIC BLOOD PRESSURE: 78 MMHG | RESPIRATION RATE: 16 BRPM | BODY MASS INDEX: 28.16 KG/M2 | HEIGHT: 65 IN | SYSTOLIC BLOOD PRESSURE: 130 MMHG

## 2025-09-04 DIAGNOSIS — G47.33 OSA (OBSTRUCTIVE SLEEP APNEA): Primary | ICD-10-CM

## 2025-09-04 DIAGNOSIS — R35.1 NOCTURIA: ICD-10-CM

## 2025-09-04 DIAGNOSIS — G47.00 INSOMNIA, UNSPECIFIED TYPE: ICD-10-CM

## 2025-09-04 PROCEDURE — 99213 OFFICE O/P EST LOW 20 MIN: CPT

## 2025-09-04 PROCEDURE — 3075F SYST BP GE 130 - 139MM HG: CPT

## 2025-09-04 PROCEDURE — 3078F DIAST BP <80 MM HG: CPT

## 2025-09-04 ASSESSMENT — SLEEP AND FATIGUE QUESTIONNAIRES
HOW LIKELY ARE YOU TO NOD OFF OR FALL ASLEEP WHILE SITTING INACTIVE IN A PUBLIC PLACE: WOULD NEVER DOZE
HOW LIKELY ARE YOU TO NOD OFF OR FALL ASLEEP IN A CAR, WHILE STOPPED FOR A FEW MINUTES IN TRAFFIC: WOULD NEVER DOZE
HOW LIKELY ARE YOU TO NOD OFF OR FALL ASLEEP WHEN YOU ARE A PASSENGER IN A CAR FOR AN HOUR WITHOUT A BREAK: WOULD NEVER DOZE
HOW LIKELY ARE YOU TO NOD OFF OR FALL ASLEEP WHILE WATCHING TV: SLIGHT CHANCE OF DOZING
HOW LIKELY ARE YOU TO NOD OFF OR FALL ASLEEP WHILE SITTING QUIETLY AFTER LUNCH WITHOUT ALCOHOL: SLIGHT CHANCE OF DOZING
HOW LIKELY ARE YOU TO NOD OFF OR FALL ASLEEP WHILE SITTING AND READING: SLIGHT CHANCE OF DOZING
HOW LIKELY ARE YOU TO NOD OFF OR FALL ASLEEP WHILE SITTING AND TALKING TO SOMEONE: WOULD NEVER DOZE
HOW LIKELY ARE YOU TO NOD OFF OR FALL ASLEEP WHILE LYING DOWN TO REST IN THE AFTERNOON WHEN CIRCUMSTANCES PERMIT: WOULD NEVER DOZE
ESS TOTAL SCORE: 3

## (undated) DEVICE — SOLUTION IV IRRIG WATER 500ML POUR BRL ST 2F7113

## (undated) DEVICE — CATHETER IV 20GA L1.25IN PNK FEP SFTY STR HUB RADPQ DISP

## (undated) DEVICE — SET GRAV VENT NVENT CK VLV 3 NDL FREE PRT 10 GTT

## (undated) DEVICE — 3M™ TEGADERM™ TRANSPARENT FILM DRESSING FRAME STYLE, 1624W, 2-3/8 IN X 2-3/4 IN (6 CM X 7 CM), 100/CT 4CT/CASE: Brand: 3M™ TEGADERM™

## (undated) DEVICE — Device: Brand: DISPOSABLE ELECTROSURGICAL SNARE

## (undated) DEVICE — SUTURE CHROMIC GUT SZ 4-0 L18IN ABSRB BRN L19MM PS-2 3/8 1637G

## (undated) DEVICE — ZIMMER® STERILE DISPOSABLE TOURNIQUET CUFF WITH PLC, DUAL PORT, SINGLE BLADDER, 18 IN. (46 CM)

## (undated) DEVICE — GOWN,SIRUS,NON REINFRCD,LARGE,SET IN SL: Brand: MEDLINE

## (undated) DEVICE — GLOVE,SURG,SENSICARE,ALOE,LF,PF,7: Brand: MEDLINE

## (undated) DEVICE — SET ADMIN PRIMING 7ML L30IN 7.35LB 20 GTT 2ND RLER CLMP

## (undated) DEVICE — Device

## (undated) DEVICE — GLOVE SURG SZ 65 L12IN FNGR THK94MIL STD WHT LTX FREE

## (undated) DEVICE — GLOVE ORANGE PI 7 1/2   MSG9075

## (undated) DEVICE — GLOVE SURG SZ 65 L12IN FNGR THK79MIL GRN LTX FREE

## (undated) DEVICE — DRAPE HND W114XL142IN BLU POLYPR W O PCH FEN CRD AND TB HLDR

## (undated) DEVICE — PROCEDURE KIT ENDOSCP CUST

## (undated) DEVICE — DRESSING PETRO W7.6XL7.6CM CELOS ACETT NONADHERING MESH

## (undated) DEVICE — CORD ES L12FT BPLR FRCP

## (undated) DEVICE — SOLUTION IV IRRIG 500ML 0.9% SODIUM CHL 2F7123

## (undated) DEVICE — GOWN AURORA NONREINF LG: Brand: MEDLINE INDUSTRIES, INC.

## (undated) DEVICE — SYRINGE MED 10ML LUERLOCK TIP W/O SFTY DISP

## (undated) DEVICE — NEEDLE HYPO 25GA L1.5IN BLU POLYPR HUB S STL REG BVL STR

## (undated) DEVICE — SOLUTION IV 1000ML LAC RINGERS PH 6.5 INJ USP VIAFLX PLAS

## (undated) DEVICE — BW-412T DISP COMBO CLEANING BRUSH: Brand: SINGLE USE COMBINATION CLEANING BRUSH

## (undated) DEVICE — NEEDLE HYPO 18GA L1.5IN THN WALL PIVOTING SHLD BVL ORIENTED

## (undated) DEVICE — UNDERGLOVE SURG SZ 8 FNGR THK0.21MIL GRN LTX BEAD CUF

## (undated) DEVICE — TRAP SPEC RETRV CLR PLAS POLYP IN LN SUCT QUIK CTCH

## (undated) DEVICE — BANDAGE COMPR W2INXL5YD TAN BRTH SELF ADH WRP W/ HND TEAR

## (undated) DEVICE — SET VLV 3 PC AWS DISPOSABLE GRDIAN SCOPEVALET